# Patient Record
Sex: MALE
[De-identification: names, ages, dates, MRNs, and addresses within clinical notes are randomized per-mention and may not be internally consistent; named-entity substitution may affect disease eponyms.]

---

## 2023-08-15 SDOH — ECONOMIC STABILITY: HOUSING INSECURITY
IN THE LAST 12 MONTHS, WAS THERE A TIME WHEN YOU DID NOT HAVE A STEADY PLACE TO SLEEP OR SLEPT IN A SHELTER (INCLUDING NOW)?: NO

## 2023-08-15 SDOH — ECONOMIC STABILITY: FOOD INSECURITY: WITHIN THE PAST 12 MONTHS, YOU WORRIED THAT YOUR FOOD WOULD RUN OUT BEFORE YOU GOT MONEY TO BUY MORE.: NEVER TRUE

## 2023-08-15 SDOH — ECONOMIC STABILITY: HOUSING INSECURITY: IN THE LAST 12 MONTHS, HOW MANY PLACES HAVE YOU LIVED?: 2

## 2023-08-15 SDOH — ECONOMIC STABILITY: INCOME INSECURITY: IN THE LAST 12 MONTHS, WAS THERE A TIME WHEN YOU WERE NOT ABLE TO PAY THE MORTGAGE OR RENT ON TIME?: NO

## 2023-08-15 SDOH — HEALTH STABILITY: PHYSICAL HEALTH: ON AVERAGE, HOW MANY MINUTES DO YOU ENGAGE IN EXERCISE AT THIS LEVEL?: 40 MIN

## 2023-08-15 SDOH — HEALTH STABILITY: MENTAL HEALTH
STRESS IS WHEN SOMEONE FEELS TENSE, NERVOUS, ANXIOUS, OR CAN'T SLEEP AT NIGHT BECAUSE THEIR MIND IS TROUBLED. HOW STRESSED ARE YOU?: VERY MUCH

## 2023-08-15 SDOH — ECONOMIC STABILITY: TRANSPORTATION INSECURITY
IN THE PAST 12 MONTHS, HAS LACK OF RELIABLE TRANSPORTATION KEPT YOU FROM MEDICAL APPOINTMENTS, MEETINGS, WORK OR FROM GETTING THINGS NEEDED FOR DAILY LIVING?: NO

## 2023-08-15 SDOH — HEALTH STABILITY: PHYSICAL HEALTH: ON AVERAGE, HOW MANY DAYS PER WEEK DO YOU ENGAGE IN MODERATE TO STRENUOUS EXERCISE (LIKE A BRISK WALK)?: 1 DAY

## 2023-08-15 SDOH — ECONOMIC STABILITY: TRANSPORTATION INSECURITY
IN THE PAST 12 MONTHS, HAS LACK OF TRANSPORTATION KEPT YOU FROM MEETINGS, WORK, OR FROM GETTING THINGS NEEDED FOR DAILY LIVING?: NO

## 2023-08-15 SDOH — ECONOMIC STABILITY: TRANSPORTATION INSECURITY
IN THE PAST 12 MONTHS, HAS THE LACK OF TRANSPORTATION KEPT YOU FROM MEDICAL APPOINTMENTS OR FROM GETTING MEDICATIONS?: NO

## 2023-08-15 SDOH — ECONOMIC STABILITY: FOOD INSECURITY: WITHIN THE PAST 12 MONTHS, THE FOOD YOU BOUGHT JUST DIDN'T LAST AND YOU DIDN'T HAVE MONEY TO GET MORE.: NEVER TRUE

## 2023-08-15 SDOH — ECONOMIC STABILITY: INCOME INSECURITY: HOW HARD IS IT FOR YOU TO PAY FOR THE VERY BASICS LIKE FOOD, HOUSING, MEDICAL CARE, AND HEATING?: NOT HARD AT ALL

## 2023-08-15 ASSESSMENT — SOCIAL DETERMINANTS OF HEALTH (SDOH)
HOW OFTEN DO YOU GET TOGETHER WITH FRIENDS OR RELATIVES?: ONCE A WEEK
HOW OFTEN DO YOU ATTENT MEETINGS OF THE CLUB OR ORGANIZATION YOU BELONG TO?: NEVER
DO YOU BELONG TO ANY CLUBS OR ORGANIZATIONS SUCH AS CHURCH GROUPS UNIONS, FRATERNAL OR ATHLETIC GROUPS, OR SCHOOL GROUPS?: NO
HOW OFTEN DO YOU ATTEND CHURCH OR RELIGIOUS SERVICES?: NEVER
HOW OFTEN DO YOU HAVE A DRINK CONTAINING ALCOHOL: PATIENT DECLINED
HOW HARD IS IT FOR YOU TO PAY FOR THE VERY BASICS LIKE FOOD, HOUSING, MEDICAL CARE, AND HEATING?: NOT HARD AT ALL
HOW OFTEN DO YOU ATTEND CHURCH OR RELIGIOUS SERVICES?: NEVER
HOW OFTEN DO YOU HAVE SIX OR MORE DRINKS ON ONE OCCASION: MONTHLY
WITHIN THE PAST 12 MONTHS, YOU WORRIED THAT YOUR FOOD WOULD RUN OUT BEFORE YOU GOT THE MONEY TO BUY MORE: NEVER TRUE
DO YOU BELONG TO ANY CLUBS OR ORGANIZATIONS SUCH AS CHURCH GROUPS UNIONS, FRATERNAL OR ATHLETIC GROUPS, OR SCHOOL GROUPS?: NO
HOW OFTEN DO YOU GET TOGETHER WITH FRIENDS OR RELATIVES?: ONCE A WEEK
IN A TYPICAL WEEK, HOW MANY TIMES DO YOU TALK ON THE PHONE WITH FAMILY, FRIENDS, OR NEIGHBORS?: ONCE A WEEK
HOW OFTEN DO YOU ATTENT MEETINGS OF THE CLUB OR ORGANIZATION YOU BELONG TO?: NEVER
HOW MANY DRINKS CONTAINING ALCOHOL DO YOU HAVE ON A TYPICAL DAY WHEN YOU ARE DRINKING: PATIENT DECLINED
IN A TYPICAL WEEK, HOW MANY TIMES DO YOU TALK ON THE PHONE WITH FAMILY, FRIENDS, OR NEIGHBORS?: ONCE A WEEK

## 2023-08-15 ASSESSMENT — LIFESTYLE VARIABLES
AUDIT-C TOTAL SCORE: -1
HOW OFTEN DO YOU HAVE SIX OR MORE DRINKS ON ONE OCCASION: MONTHLY
HOW MANY STANDARD DRINKS CONTAINING ALCOHOL DO YOU HAVE ON A TYPICAL DAY: PATIENT DECLINED
HOW OFTEN DO YOU HAVE A DRINK CONTAINING ALCOHOL: PATIENT DECLINED
SKIP TO QUESTIONS 9-10: 0

## 2023-08-16 ENCOUNTER — OFFICE VISIT (OUTPATIENT)
Dept: MEDICAL GROUP | Facility: CLINIC | Age: 43
End: 2023-08-16
Payer: COMMERCIAL

## 2023-08-16 VITALS
HEART RATE: 80 BPM | SYSTOLIC BLOOD PRESSURE: 134 MMHG | DIASTOLIC BLOOD PRESSURE: 91 MMHG | TEMPERATURE: 97.9 F | BODY MASS INDEX: 23.85 KG/M2 | WEIGHT: 161 LBS | OXYGEN SATURATION: 95 % | HEIGHT: 69 IN | RESPIRATION RATE: 16 BRPM

## 2023-08-16 DIAGNOSIS — Z82.49 FAMILY HISTORY OF HYPERTENSION IN FATHER: ICD-10-CM

## 2023-08-16 DIAGNOSIS — G47.33 OSA (OBSTRUCTIVE SLEEP APNEA): ICD-10-CM

## 2023-08-16 DIAGNOSIS — F32.9 CURRENT EPISODE OF MAJOR DEPRESSIVE DISORDER WITHOUT PRIOR EPISODE, UNSPECIFIED DEPRESSION EPISODE SEVERITY: ICD-10-CM

## 2023-08-16 DIAGNOSIS — F17.200 CURRENT EVERY DAY SMOKER: ICD-10-CM

## 2023-08-16 PROCEDURE — 3075F SYST BP GE 130 - 139MM HG: CPT

## 2023-08-16 PROCEDURE — 99203 OFFICE O/P NEW LOW 30 MIN: CPT | Mod: GE

## 2023-08-16 PROCEDURE — 3080F DIAST BP >= 90 MM HG: CPT

## 2023-08-16 RX ORDER — BUPROPION HYDROCHLORIDE 150 MG/1
TABLET, EXTENDED RELEASE ORAL
Qty: 180 TABLET | Refills: 0 | Status: SHIPPED | OUTPATIENT
Start: 2023-08-16 | End: 2023-09-15

## 2023-08-16 RX ORDER — ESCITALOPRAM OXALATE 10 MG/1
10 TABLET ORAL DAILY
Qty: 30 TABLET | Refills: 2 | Status: CANCELLED | OUTPATIENT
Start: 2023-08-16 | End: 2023-11-14

## 2023-08-16 ASSESSMENT — ANXIETY QUESTIONNAIRES
4. TROUBLE RELAXING: MORE THAN HALF THE DAYS
2. NOT BEING ABLE TO STOP OR CONTROL WORRYING: NEARLY EVERY DAY
6. BECOMING EASILY ANNOYED OR IRRITABLE: SEVERAL DAYS
7. FEELING AFRAID AS IF SOMETHING AWFUL MIGHT HAPPEN: NEARLY EVERY DAY
5. BEING SO RESTLESS THAT IT IS HARD TO SIT STILL: MORE THAN HALF THE DAYS
GAD7 TOTAL SCORE: 17
1. FEELING NERVOUS, ANXIOUS, OR ON EDGE: NEARLY EVERY DAY
3. WORRYING TOO MUCH ABOUT DIFFERENT THINGS: NEARLY EVERY DAY

## 2023-08-16 ASSESSMENT — PATIENT HEALTH QUESTIONNAIRE - PHQ9
CLINICAL INTERPRETATION OF PHQ2 SCORE: 4
5. POOR APPETITE OR OVEREATING: 3 - NEARLY EVERY DAY
SUM OF ALL RESPONSES TO PHQ QUESTIONS 1-9: 20

## 2023-08-16 NOTE — PROGRESS NOTES
This note is formatted in an APSO format, for additional subjective and objective evaluation please scroll to the bottom of the note.    CC:  Chief Complaint   Patient presents with    Establish Care    Hypertension    Anxiety       Assessment/Plan:  Problem List Items Addressed This Visit       Family history of hypertension in father     - Recommend home BP log for 2 weeks, pt [facility] and we will prescribe an antihypertensive agent if indicated.  - Follow-up on sleep studies for known GIANFRANCO  - Counseled on smoking cessation         GIANFRANCO (obstructive sleep apnea)     - Requesting CPAP supplies, will refer to sleep medicine  - Referral to sleep medicine placed, printed, handed to patient         Relevant Orders    Referral to Pulmonary and Sleep Medicine    MDD (major depressive disorder)     - Start bupropion 150 mg SR daily for 3 days, then 150 mg SR twice daily thereafter  - Pt referred to Dr. Hernandez, she met with the patient today and agrees to schedule an appointment with him         Relevant Medications    buPROPion SR (WELLBUTRIN-SR) 150 MG TABLET SR 12 HR sustained-release tablet    Current every day smoker     - Not interested in cessation right now, but would be interested in a few months.  - Counseled on importance of smoking cessation for improvement of blood pressure.           No orders of the defined types were placed in this encounter.      Return in about 1 month (around 9/16/2023) for BP and depression follow-up.      HISTORY OF PRESENT ILLNESS: Patient is a 42 y.o. male established patient who presents today with:    Problem   Family History of Hypertension in Father    Father with hypertension, no history of CAD or CVA. Used BP cuff at pharmacy, noticed number was high around 150/95. Complaining of headaches, and extremity tingling when he feels like his BP was high. Never taken any BP meds in the past.     Gianfranco (Obstructive Sleep Apnea)    Diagnosed in [place].   Started using CPAP again for the past month because he was waking up with headaches. Morning headaches improved with CPAP overnight. Last sleep study +10 years ago.     Mdd (Major Depressive Disorder)    Patient feeling increased depression anxiety as he is in the process of divorce for the last 3 months.  No prior history of mental health symptoms, no family history, no suicidal ideation.  He is now waking up with headaches, tingling. PHQ-9 20.  SHANTA-7 17.  Patient requesting medication and mental health referral.     Current Every Day Smoker    10 pack-year history, 0.5 PPD for 20 yrs. Has tried nicotine patch, caused dizziness. Has tried nicotine gum, it was too spicy.         1. Family history of hypertension in father        2. JOE (obstructive sleep apnea)        3. Current episode of major depressive disorder without prior episode, unspecified depression episode severity        4. Current every day smoker            Patient Active Problem List    Diagnosis Date Noted    Family history of hypertension in father 08/16/2023    JOE (obstructive sleep apnea) 08/16/2023    MDD (major depressive disorder) 08/16/2023    Current every day smoker 08/16/2023      Allergies:Patient has no known allergies.    Current Outpatient Medications   Medication Sig Dispense Refill    hydrocodone-acetaminophen (NORCO) 5-325 MG TABS per tablet Take 1 Tab by mouth every 6 hours as needed. 15 Tab 0     No current facility-administered medications for this visit.       Social History     Tobacco Use    Smoking status: Every Day     Packs/day: .5     Types: Cigarettes     Passive exposure: Never    Smokeless tobacco: Never    Tobacco comments:     Has tried nicotine patches and gum in the past.  Interested in trying again, but would like to wait until he is finished with his divorce.   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  He moved to [place] from [facility] 11 years ago.  He is in the process of  his wife of 10 years.       Family History   Problem Relation Age of Onset    Hypertension Father        Exam:    BP (!) 134/91 (BP Location: Left arm, Patient Position: Sitting, BP Cuff Size: Adult)   Pulse 80   Temp 36.6 °C (97.9 °F) (Temporal)   Resp 16   Ht 1.753 m (5' 9\")   Wt 73 kg (161 lb)   SpO2 95%   BMI 23.78 kg/m²  Body mass index is 23.78 kg/m².    Gen: Alert and oriented, No apparent distress. Well developed, polite, friendly  HEENT: NCAT, MMM  Neck: Supple, FROM  Chest: No deformities, Equal chest expansion  Lungs: Normal effort, CTA bilaterally, no wheezes, rhonchi, or rales  CV: Regular rate and rhythm. No murmurs, rubs, or gallops. Pulse palpable  Abd: Non-distended  Ext: No clubbing, cyanosis, edema.  Skin: Warm/dry, without rashes  Neuro: A/O x 4, CN 2-12 Grossly intact, Motor/sensory grossly intact  Psych: Flat affect, reserved.  No SI/HI.      Mehul Johansen M.D.   PGY-1  [facility]     "

## 2023-08-16 NOTE — ASSESSMENT & PLAN NOTE
- Recommend home BP log for 2 weeks, pt will send message with BP to [facility] and we will prescribe an antihypertensive agent if indicated.  - Follow-up on sleep studies for known JOE  - Counseled on smoking cessation

## 2023-08-16 NOTE — ASSESSMENT & PLAN NOTE
- Start bupropion 150 mg SR daily for 3 days, then 150 mg SR twice daily thereafter  - Pt referred to Dr. Hernandez, she met with the patient today and agrees to schedule an appointment with him

## 2023-08-16 NOTE — ASSESSMENT & PLAN NOTE
- Requesting CPAP supplies, will refer to sleep medicine  - Referral to sleep medicine placed, printed, handed to patient

## 2023-08-16 NOTE — ASSESSMENT & PLAN NOTE
- Not interested in cessation right now, but would be interested in a few months.  - Counseled on importance of smoking cessation for improvement of blood pressure.

## 2023-08-29 ENCOUNTER — OFFICE VISIT (OUTPATIENT)
Dept: MEDICAL GROUP | Facility: CLINIC | Age: 43
End: 2023-08-29
Payer: COMMERCIAL

## 2023-08-29 DIAGNOSIS — F32.0 CURRENT MILD EPISODE OF MAJOR DEPRESSIVE DISORDER, UNSPECIFIED WHETHER RECURRENT (HCC): ICD-10-CM

## 2023-08-29 PROCEDURE — 90834 PSYTX W PT 45 MINUTES: CPT | Performed by: PSYCHOLOGIST
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
## 2023-08-31 ENCOUNTER — TELEPHONE (OUTPATIENT)
Dept: HEALTH INFORMATION MANAGEMENT | Facility: OTHER | Age: 43
End: 2023-08-31
Payer: COMMERCIAL

## 2023-09-15 ENCOUNTER — OFFICE VISIT (OUTPATIENT)
Dept: MEDICAL GROUP | Facility: CLINIC | Age: 43
End: 2023-09-15
Payer: COMMERCIAL

## 2023-09-15 VITALS
WEIGHT: 160 LBS | HEART RATE: 98 BPM | TEMPERATURE: 98.7 F | SYSTOLIC BLOOD PRESSURE: 123 MMHG | BODY MASS INDEX: 23.7 KG/M2 | HEIGHT: 69 IN | DIASTOLIC BLOOD PRESSURE: 89 MMHG | OXYGEN SATURATION: 95 %

## 2023-09-15 DIAGNOSIS — F17.200 CURRENT EVERY DAY SMOKER: ICD-10-CM

## 2023-09-15 DIAGNOSIS — F32.9 CURRENT EPISODE OF MAJOR DEPRESSIVE DISORDER WITHOUT PRIOR EPISODE, UNSPECIFIED DEPRESSION EPISODE SEVERITY: ICD-10-CM

## 2023-09-15 DIAGNOSIS — G47.33 OSA (OBSTRUCTIVE SLEEP APNEA): ICD-10-CM

## 2023-09-15 DIAGNOSIS — Z82.49 FAMILY HISTORY OF HYPERTENSION IN FATHER: ICD-10-CM

## 2023-09-15 DIAGNOSIS — F19.982 DRUG-INDUCED INSOMNIA (HCC): ICD-10-CM

## 2023-09-15 PROBLEM — G47.00 INSOMNIA: Status: ACTIVE | Noted: 2023-09-15

## 2023-09-15 PROCEDURE — 99213 OFFICE O/P EST LOW 20 MIN: CPT | Mod: GC

## 2023-09-15 PROCEDURE — 3074F SYST BP LT 130 MM HG: CPT

## 2023-09-15 PROCEDURE — 3079F DIAST BP 80-89 MM HG: CPT

## 2023-09-15 RX ORDER — BUPROPION HYDROCHLORIDE 100 MG/1
100 TABLET, EXTENDED RELEASE ORAL 2 TIMES DAILY
Qty: 180 TABLET | Refills: 0 | Status: SHIPPED | OUTPATIENT
Start: 2023-09-15 | End: 2023-12-26

## 2023-09-15 ASSESSMENT — PATIENT HEALTH QUESTIONNAIRE - PHQ9
SUM OF ALL RESPONSES TO PHQ QUESTIONS 1-9: 16
CLINICAL INTERPRETATION OF PHQ2 SCORE: 2
5. POOR APPETITE OR OVEREATING: 3 - NEARLY EVERY DAY

## 2023-09-15 NOTE — PROGRESS NOTES
This note is formatted in an APSO format, for additional subjective and objective evaluation please scroll to the bottom of the note.    CC:  Chief Complaint   Patient presents with    Follow-Up     Bupropion wants to discuss sleep side effects       Assessment/Plan:  Problem List Items Addressed This Visit       Family history of hypertension in father     Blood pressure is at goal for age and risk factors.  - Discussed lifestyle interventions for blood pressure control, including aerobic exercise, 30 minutes 3 times/week for cardiovascular fitness         JOE (obstructive sleep apnea)     -Scheduled for April 2024 with sleep medicine         MDD (major depressive disorder)     Since starting bupropion, states symptoms are improving.  PHQ-9 decreased to 16 from 20 on initial presentation.  Reporting symptoms of insomnia, decreased morning erections.  - Will decrease dose of bupropion to 100 mg twice daily  - If continuing to experience side effects, will consider switching to SSRI  - Encouraged to follow-up with mental health specialists  - Follow-up in 3 months         Relevant Medications    buPROPion SR (WELLBUTRIN-SR) 100 MG TABLET SR 12 HR    Current every day smoker     Decreased desire for smoking since starting bupropion.  - Continue bupropion  - Additional counseling for smoking cessation given         Insomnia     Associated with starting bupropion, no prior history of insomnia prior to starting this medication.  Depression scores improved, smoking desire decreased with this medication.  - We will start with decreasing dose from 150 to 100 mg twice daily  - If continuing to struggle with insomnia, will switch to SSRIs once daily           Orders Placed This Encounter    buPROPion SR (WELLBUTRIN-SR) 100 MG TABLET SR 12 HR       Return in about 3 months (around 12/15/2023) for medication follow up.      HISTORY OF PRESENT ILLNESS: Patient is a 43 y.o. male established patient who presents today  with:    Problem   Insomnia    Starting 3 weeks ago, one week after starting bupropion. Taking medication at 7:30 AM/PM. Able to fall asleep, but consistently waking up between 3 and 4 AM every day. Unable to go back to sleep. Feeling tired throughout the day as a result. Has not tried any other medications to help with sleep.     Family History of Hypertension in Father    Father with hypertension, no history of CAD or CVA. Used BP cuff at pharmacy, noticed number was high around 150/95. Complaining of headaches, and extremity tingling when he feels like his BP was high. Never taken any BP meds in the past.    On follow-up, blood pressures at home are in the 120s to 130s systolic     Gianfranco (Obstructive Sleep Apnea)    Diagnosed in [place].  Started using CPAP again for the past month because he was waking up with headaches. Morning headaches improved with CPAP overnight. Last sleep study +10 years ago.     Mdd (Major Depressive Disorder)    Patient feeling increased depression anxiety as he is in the process of divorce for the last 3 months.  No prior history of mental health symptoms, no family history, no suicidal ideation.  He is now waking up with headaches, tingling. PHQ-9 20.  SHANTA-7 17.  Patient requesting medication and mental health referral.    On follow-up, symptoms improved with bupropion.  PHQ-9 16.  Did meet with psychology, but has not followed up since intake visit as he went to [place] to visit family and then has been busy with the new school year.  Is reporting some side effects, including insomnia (see #insomnia), decreased morning erections, decreased desire for tobacco smoking.     Current Every Day Smoker    10 pack-year history, 0.5 PPD for 20 yrs. Has tried nicotine patch, caused dizziness. Has tried nicotine gum, it was too spicy.    Decreased desire for smoking since starting bupropion.         1. Drug-induced insomnia (HCC)        2. Current episode of major depressive disorder without prior  "episode, unspecified depression episode severity        3. JOE (obstructive sleep apnea)        4. Current every day smoker        5. Family history of hypertension in father            Patient Active Problem List    Diagnosis Date Noted    Insomnia 09/15/2023    Family history of hypertension in father 08/16/2023    JOE (obstructive sleep apnea) 08/16/2023    MDD (major depressive disorder) 08/16/2023    Current every day smoker 08/16/2023      Allergies:Patient has no known allergies.    Current Outpatient Medications   Medication Sig Dispense Refill    buPROPion SR (WELLBUTRIN-SR) 100 MG TABLET SR 12 HR Take 1 Tablet by mouth 2 times a day for 90 days. 180 Tablet 0     No current facility-administered medications for this visit.       Social History     Tobacco Use    Smoking status: Every Day     Current packs/day: 0.50     Types: Cigarettes     Passive exposure: Never    Smokeless tobacco: Never    Tobacco comments:     Has tried nicotine patches and gum in the past.  Interested in trying again, but would like to wait until he is finished with his divorce.   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 11 years ago.  He is in the process of  his wife of 10 years.       Family History   Problem Relation Age of Onset    Hypertension Father        Exam:    /89 (BP Location: Left arm, Patient Position: Sitting, BP Cuff Size: Adult)   Pulse 98   Temp 37.1 °C (98.7 °F) (Temporal)   Ht 1.753 m (5' 9\")   Wt 72.6 kg (160 lb)   SpO2 95%   BMI 23.63 kg/m²  Body mass index is 23.63 kg/m².    Gen: Alert and oriented, No apparent distress. Well developed  HEENT: NCAT, MMM  Neck: Supple, FROM  Chest: No deformities, Equal chest expansion  Lungs: Normal effort, CTA bilaterally, no wheezes, rhonchi, or " rales  CV: Regular rate and rhythm. No murmurs, rubs, or gallops. Pulse palpable  Abd: Non-distended  Ext: No clubbing, cyanosis, edema.  Skin: Warm/dry, without rashes  Neuro: A/O x 4, CN 2-12 Grossly intact, Motor/sensory grossly intact  Psych: Depressed affect. PHQ-9 score 16. Denies SI/HI      Mehul Johansen M.D.   PGY-1  [facility]

## 2023-09-15 NOTE — ASSESSMENT & PLAN NOTE
Decreased desire for smoking since starting bupropion.  - Continue bupropion  - Additional counseling for smoking cessation given

## 2023-09-15 NOTE — ASSESSMENT & PLAN NOTE
Since starting bupropion, states symptoms are improving.  PHQ-9 decreased to 16 from 20 on initial presentation.  Reporting symptoms of insomnia, decreased morning erections.  - Will decrease dose of bupropion to 100 mg twice daily  - If continuing to experience side effects, will consider switching to SSRI  - Encouraged to follow-up with mental health specialists  - Follow-up in 3 months   n/a n/a done n/a

## 2023-09-15 NOTE — ASSESSMENT & PLAN NOTE
Blood pressure is at goal for age and risk factors.  - Discussed lifestyle interventions for blood pressure control, including aerobic exercise, 30 minutes 3 times/week for cardiovascular fitness

## 2023-09-15 NOTE — ASSESSMENT & PLAN NOTE
Associated with starting bupropion, no prior history of insomnia prior to starting this medication.  Depression scores improved, smoking desire decreased with this medication.  - We will start with decreasing dose from 150 to 100 mg twice daily  - If continuing to struggle with insomnia, will switch to SSRIs once daily

## 2024-04-08 ENCOUNTER — OFFICE VISIT (OUTPATIENT)
Dept: SLEEP MEDICINE | Facility: MEDICAL CENTER | Age: 44
End: 2024-04-08
Payer: COMMERCIAL

## 2024-04-08 VITALS
WEIGHT: 170.4 LBS | HEART RATE: 84 BPM | DIASTOLIC BLOOD PRESSURE: 82 MMHG | SYSTOLIC BLOOD PRESSURE: 118 MMHG | RESPIRATION RATE: 16 BRPM | HEIGHT: 68 IN | BODY MASS INDEX: 25.82 KG/M2 | OXYGEN SATURATION: 97 %

## 2024-04-08 DIAGNOSIS — R06.83 SNORING: ICD-10-CM

## 2024-04-08 DIAGNOSIS — G47.19 EXCESSIVE DAYTIME SLEEPINESS: ICD-10-CM

## 2024-04-08 DIAGNOSIS — G47.33 OSA (OBSTRUCTIVE SLEEP APNEA): Primary | ICD-10-CM

## 2024-04-08 PROCEDURE — 99211 OFF/OP EST MAY X REQ PHY/QHP: CPT | Performed by: STUDENT IN AN ORGANIZED HEALTH CARE EDUCATION/TRAINING PROGRAM

## 2024-04-08 PROCEDURE — 3074F SYST BP LT 130 MM HG: CPT | Performed by: STUDENT IN AN ORGANIZED HEALTH CARE EDUCATION/TRAINING PROGRAM

## 2024-04-08 PROCEDURE — 3079F DIAST BP 80-89 MM HG: CPT | Performed by: STUDENT IN AN ORGANIZED HEALTH CARE EDUCATION/TRAINING PROGRAM

## 2024-04-08 PROCEDURE — 99203 OFFICE O/P NEW LOW 30 MIN: CPT | Performed by: STUDENT IN AN ORGANIZED HEALTH CARE EDUCATION/TRAINING PROGRAM

## 2024-04-08 ASSESSMENT — PATIENT HEALTH QUESTIONNAIRE - PHQ9: CLINICAL INTERPRETATION OF PHQ2 SCORE: 0

## 2024-04-08 NOTE — PROGRESS NOTES
[facility] Sleep Center Consult Note     Date: 4/8/2024 / Time: 2:02 PM      Thank you for requesting a sleep medicine consultation on Ashley Corrales at the sleep center. Presents today with the chief complaints of snoring, unrefreshing sleep, history of obstructive sleep apnea. He is referred by Mehul Johansen M.D.  [address] [place] [id number] for evaluation and treatment of sleep disorder breathing .     HISTORY OF PRESENT ILLNESS:     Ashley Corrales is a 43 y.o. male with history of obstructive sleep apnea, daytime fatigue, unrefreshing sleep and snoring.  Presents to Sleep Clinic for evaluation of sleep.     He reports being diagnosed with obstructive sleep apnea over 13 years ago while in [place].  He did purchase a Derik PAP machine and has tried to use it for years but has had difficulty using it.  He gets significantly bloated with use.  He has tried multiple different masks and lip tape.  He states his machine can also become a auto BiPAP and even tried BiPAP without benefit. Even with using the CPAP for several months he never really felt it was helping his sleep.    He continues to find his sleep nonrestorative.  He can have fatigue during the day.  He can be irritable at times during the day.  He states in the past he has been told that he stops breathing.  He does wake up with a dry mouth and occasionally has morning headaches.    As per supplemental questionnaire to be scanned or imported into chart:    [place] Sleepiness Score: 10    Sleep Schedule  Bedtime: Weekday 9-10pm  Weekend  10-11pm  Wake time: Weekday 6-7am Weekend 9-10am  Sleep-onset latency: mins  Awakenings from sleep: 1-2   Difficulty falling back asleep: No  Bedroom partner: No  Naps: Yes sometimes    DAYTIME SYMPTOMS:   Excessive daytime sleepiness: Yes  Daytime fatigue: Yes  Difficulty concentrating: at times   Memory problems: No   Irritability:Yes sometimes   Work/school performance issues: No   Sleepiness with  "driving: No , long drives   Caffeine/stimulant use: Yes  Alcohol use:Yes, How Many? Once a week      SLEEP RELATED SYMPTOMS  Snoring: Yes  Witnessed apnea or gasping/choking: Yes, witnssed apneas   Dry mouth or mouth breathing: Yes  Sweating: No   Teeth grinding/biting: No   Morning headaches: Yes  Refreshed Upon Awakening: No      SLEEP RELATED BEHAVIORS:  Parasomnias (walking, talking, eating, violence): No   Leg kicking: No   Restless legs - \"urge to move\": No   Nightmares: No  Recurrent: No   Dream enactment: No      NARCOLEPSY:  Cataplexy: No   Sleep paralysis: No   Sleep attacks: No   Hypnagogic/hypnopompic hallucinations: No     MEDICAL HISTORY  Past Medical History:   Diagnosis Date    Apnea, sleep     Daytime sleepiness     Gasping for breath     Morning headache     JOE (obstructive sleep apnea) 2013    Snoring         SURGICAL HISTORY  No past surgical history on file.     FAMILY HISTORY  Family History   Problem Relation Age of Onset    Hypertension Father        SOCIAL HISTORY  Social History     Socioeconomic History    Marital status: Unknown    Highest education level: Doctorate   Tobacco Use    Smoking status: Every Day     Current packs/day: 0.50     Types: Cigarettes     Passive exposure: Never    Smokeless tobacco: Never    Tobacco comments:     Has tried nicotine patches and gum in the past.  Interested in trying again, but would like to wait until he is finished with his divorce.   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  He is in the process of  his wife of 10 years.     Social Determinants of Health     Financial Resource Strain: Low Risk  (8/15/2023)    Overall Financial Resource Strain (CARDIA)     Difficulty of Paying " Living Expenses: Not hard at all   Food Insecurity: No Food Insecurity (8/15/2023)    Hunger Vital Sign     Worried About Running Out of Food in the Last Year: Never true     Ran Out of Food in the Last Year: Never true   Transportation Needs: No Transportation Needs (8/15/2023)    PRAPARE - Transportation     Lack of Transportation (Medical): No     Lack of Transportation (Non-Medical): No   Physical Activity: Insufficiently Active (8/15/2023)    Exercise Vital Sign     Days of Exercise per Week: 1 day     Minutes of Exercise per Session: 40 min   Stress: Stress Concern Present (8/15/2023)    [place] [employer] - Occupational Stress Questionnaire     Feeling of Stress : Very much   Social Connections: Socially Isolated (8/15/2023)    Social Connection and Isolation Panel [NHANES]     Frequency of Communication with Friends and Family: Once a week     Frequency of Social Gatherings with Friends and Family: Once a week     Attends Spiritism Services: Never     Active Member of Clubs or Organizations: No     Attends Club or Organization Meetings: Never     Marital Status:    Housing Stability: Low Risk  (8/15/2023)    Housing Stability Vital Sign     Unable to Pay for Housing in the Last Year: No     Number of Places Lived in the Last Year: 2     Unstable Housing in the Last Year: No        Occupation: Professor [employer]     CURRENT MEDICATIONS  Current Outpatient Medications   Medication Sig Dispense Refill    buPROPion SR (WELLBUTRIN-SR) 100 MG TABLET SR 12 HR Take 1 Tablet by mouth 2 times a day for 90 days. 180 Tablet 3     No current facility-administered medications for this visit.       REVIEW OF SYSTEMS  Constitutional: Denies fevers, Denies weight changes  Ears/Nose/Throat/Mouth: Denies nasal congestion or sore throat   Cardiovascular: Denies chest pain  Respiratory: Denies shortness of breath, Denies cough  Gastrointestinal/Hepatic: Denies nausea, vomiting  Sleep:  "see HPI    Physical Examination:  Vitals/ General Appearance:   Weight/BMI: Body mass index is 25.91 kg/m².  /82 (BP Location: Left arm, Patient Position: Sitting, BP Cuff Size: Adult)   Pulse 84   Resp 16   Ht 1.727 m (5' 8\")   Wt 77.3 kg (170 lb 6.4 oz)   SpO2 97%   Vitals:    04/08/24 1405   BP: 118/82   BP Location: Left arm   Patient Position: Sitting   BP Cuff Size: Adult   Pulse: 84   Resp: 16   SpO2: 97%   Weight: 77.3 kg (170 lb 6.4 oz)   Height: 1.727 m (5' 8\")       Pt. is alert and oriented to time, place and person. Cooperative and in no apparent distress.     Constitutional: Alert, no distress, well-groomed.  Skin: No rashes in visible areas.  Eye: Round. Conjunctiva clear, lids normal. No icterus.   ENT EXAM  Nasal alae/valves collapsible: No   Nasal septum deviation: No   Nasal turbinate hypertrophy: Left: Grade 1   Right: Grade 1  Hard palate narrow: No   Hard palate high: No   Soft palate/uvula (Mallampati score): 4  Tongue Scalloping: No   Retrognathia: No   Micrognathia: No   Cardiovascular:no murmus/gallops/rubs, normal S1 and S2 heart sounds, regular rate and rhythm  Pulmonary:Clear to auscultation, No wheezes, No crackles.  Neurologic:Awake, alert and oriented x 3, Normal age appropriate gait, No involuntary motions.  Extremities: No clubbing, cyanosis, or edema       ASSESSMENT AND PLAN   Ashley oCrrales is a 43 y.o. male with history of obstructive sleep apnea, daytime fatigue, unrefreshing sleep and snoring.  Presents to Sleep Clinic for evaluation of sleep.     1. Ashley Corrales  has hx of Obstructive Sleep Apnea (JOE). Ashley Corrales has symptoms of snoring,  witnessed apnea, dry mouth, morning headaches, unrefreshed upon awakening. These can interfere with activities of daily living.   ESS 10  Pt has risk factors for JOE include crowded oropharynx.     The pathophysiology of JOE and the increased risk of cardiovascular morbidity from untreated JOE is discussed in detail with the " patient.       We have discussed diagnostic options including in-laboratory, attended polysomnography and home sleep testing. We have also discussed treatment options including airway pressurization, reconstructive otolaryngologic surgery, dental appliances and weight management.     Subsequently,treatment options will be discussed based on the diagnostic study. Meanwhile, He is urged to avoid supine sleep, weight gain and alcoholic beverages since all of these can worsen JOE. He is cautioned against drowsy driving. If He feels sleepy while driving, advised must pull over for a break/nap, rather than persist on the road, in the interest of Pt's own safety and that of others on the road.    Plan  -  He  will be scheduled for an overnight HST to assess sleep related breathing disorder.  - Follow up 1-2 weeks after sleep study to discuss results and treatment options moving forward   -Advised to reach out via MyChart or by phone with any questions or concerns.     2.  Regarding treatment of other past medical problems and general health maintenance,  Pt is urged to follow up with PCP.      Please note portions of this record was created using voice recognition software. I have made every reasonable attempt to correct obvious errors, but I expect that there are errors of grammar and possibly content I did not discover before finalizing the note.

## 2024-04-24 ENCOUNTER — OFFICE VISIT (OUTPATIENT)
Dept: MEDICAL GROUP | Facility: CLINIC | Age: 44
End: 2024-04-24
Payer: COMMERCIAL

## 2024-04-24 VITALS
HEART RATE: 66 BPM | OXYGEN SATURATION: 97 % | DIASTOLIC BLOOD PRESSURE: 102 MMHG | WEIGHT: 169 LBS | TEMPERATURE: 97 F | SYSTOLIC BLOOD PRESSURE: 149 MMHG | HEIGHT: 69 IN | BODY MASS INDEX: 25.03 KG/M2 | RESPIRATION RATE: 12 BRPM

## 2024-04-24 DIAGNOSIS — F32.9 CURRENT EPISODE OF MAJOR DEPRESSIVE DISORDER WITHOUT PRIOR EPISODE, UNSPECIFIED DEPRESSION EPISODE SEVERITY: ICD-10-CM

## 2024-04-24 DIAGNOSIS — R40.0 DAYTIME SLEEPINESS: ICD-10-CM

## 2024-04-24 DIAGNOSIS — F17.200 CURRENT EVERY DAY SMOKER: ICD-10-CM

## 2024-04-24 DIAGNOSIS — Z82.49 FAMILY HISTORY OF HYPERTENSION IN FATHER: ICD-10-CM

## 2024-04-24 DIAGNOSIS — Z00.00 HEALTH CARE MAINTENANCE: ICD-10-CM

## 2024-04-24 DIAGNOSIS — Z83.3 FAMILY HISTORY OF DIABETES MELLITUS: ICD-10-CM

## 2024-04-24 LAB
HBA1C MFR BLD: 4.9 % (ref ?–5.8)
POCT INT CON NEG: NEGATIVE
POCT INT CON POS: POSITIVE

## 2024-04-24 PROCEDURE — 83036 HEMOGLOBIN GLYCOSYLATED A1C: CPT | Mod: QW,GC

## 2024-04-24 PROCEDURE — 99214 OFFICE O/P EST MOD 30 MIN: CPT | Mod: GC

## 2024-04-24 RX ORDER — VARENICLINE TARTRATE 0.5 (11)-1
KIT ORAL
Qty: 1 EACH | Refills: 0 | Status: SHIPPED | OUTPATIENT
Start: 2024-04-24 | End: 2024-05-22

## 2024-04-24 ASSESSMENT — PATIENT HEALTH QUESTIONNAIRE - PHQ9: CLINICAL INTERPRETATION OF PHQ2 SCORE: 0

## 2024-04-25 NOTE — ASSESSMENT & PLAN NOTE
Family history of hypertension with elevated BP reading in office.  However, patient has been checking blood pressure at home a few times per week with pressures in the 110s over 90s typically.  - Patient instructed to continue monitor blood pressure at home  - No medication intervention at this time

## 2024-04-25 NOTE — ASSESSMENT & PLAN NOTE
Excess daytime sleepiness following meals.  Patient has a history of JOE since age 30, with patient reporting symptoms much earlier, as early as middle school.  Has been in to see sleep specialists and has a sleep study ordered but not yet scheduled.  No signs or symptoms of narcolepsy, no depressive symptoms at this time to suggest psychiatric cause.  I suspect his symptoms are most likely related to untreated sleep apnea.  - Recommend patient follow-up closely with sleep medicine to obtain sleep study  - Ordered CBC, CMP, lipid panel, TSH, A1c to rule out metabolic causes  - Follow-up in 2 months with results of sleep study and lab results

## 2024-04-25 NOTE — ASSESSMENT & PLAN NOTE
All depressive symptoms seem to have resolved since his divorce was finalized earlier this year.  PHQ-9 score is 0 today.  Patient is complaining of excessive sleepiness after meals; however, in the absence of any other depressive symptoms, depression is not likely the underlying etiology.  - Monitor for recurrence of depressive symptoms

## 2024-04-25 NOTE — ASSESSMENT & PLAN NOTE
Point care A1c in office today is 4.9.  Informed patient of results.  - Consider yearly A1c screening or sooner if patient is symptomatic

## 2024-04-25 NOTE — ASSESSMENT & PLAN NOTE
- Ordered starter pack of Chantix  - Patient counseled on behavioral modifications, recommended setting a quit date  - Follow-up in 30 days via MyChart on current status of medication, will refill for 2 additional months if needed   hair removal not indicated

## 2024-04-25 NOTE — PROGRESS NOTES
This note is formatted in an APSO format, for additional subjective and objective evaluation please scroll to the bottom of the note.    CC:  Chief Complaint   Patient presents with    Annual Exam    Fatigue     After any meal- 2 months        Assessment/Plan:  Problem List Items Addressed This Visit          Family Medicine Problems    Health care maintenance     - Referral to [facility] LOTT study  - Ordered once in lifetime HCV/HIV screening         Relevant Orders    Referral to Genetic Research Studies    HEP C VIRUS ANTIBODY    HIV AG/AB COMBO ASSAY SCREENING       Other    Family history of hypertension in father     Family history of hypertension with elevated BP reading in office.  However, patient has been checking blood pressure at home a few times per week with pressures in the 110s over 90s typically.  - Patient instructed to continue monitor blood pressure at home  - No medication intervention at this time         MDD (major depressive disorder)     All depressive symptoms seem to have resolved since his divorce was finalized earlier this year.  PHQ-9 score is 0 today.  Patient is complaining of excessive sleepiness after meals; however, in the absence of any other depressive symptoms, depression is not likely the underlying etiology.  - Monitor for recurrence of depressive symptoms         Current every day smoker     - Ordered starter pack of Chantix  - Patient counseled on behavioral modifications, recommended setting a quit date  - [facility], will refill for 2 additional months if needed         Relevant Medications    Varenicline Tartrate, Starter, 0.5 MG X 11 & 1 MG X 42 Tablet Therapy Pack    Daytime sleepiness     Excess daytime sleepiness following meals.  Patient has a history of JOE since age 30, with patient reporting symptoms much earlier, as early as middle school.  Has been in to see sleep specialists and has a sleep  "study ordered but not yet scheduled.  No signs or symptoms of narcolepsy, no depressive symptoms at this time to suggest psychiatric cause.  I suspect his symptoms are most likely related to untreated sleep apnea.  - Recommend patient follow-up closely with sleep medicine to obtain sleep study  - Ordered CBC, CMP, lipid panel, TSH, A1c to rule out metabolic causes  - Follow-up in 2 months with results of sleep study and lab results         Relevant Orders    CBC WITH DIFFERENTIAL    Comp Metabolic Panel    TSH WITH REFLEX TO FT4    Lipid Profile    Family history of diabetes mellitus     Point care A1c in office today is 4.9.  Informed patient of results.  - Consider yearly A1c screening or sooner if patient is symptomatic         Relevant Orders    POCT  A1C (Completed)    CBC WITH DIFFERENTIAL    Comp Metabolic Panel    TSH WITH REFLEX TO FT4    Lipid Profile      Orders Placed This Encounter    CBC WITH DIFFERENTIAL    Comp Metabolic Panel    TSH WITH REFLEX TO FT4    Lipid Profile    HEP C VIRUS ANTIBODY    HIV AG/AB COMBO ASSAY SCREENING    Referral to Genetic Research Studies    POCT  A1C    Varenicline Tartrate, Starter, 0.5 MG X 11 & 1 MG X 42 Tablet Therapy Pack       Return in about 2 months (around 6/24/2024) for JOE, labs f/u.    Future Appointments   Date Time Provider Department Center   5/24/2024  1:20 PM SLEEP CLINIC [facility] None   5/29/2024  2:00 PM Mehul Johansen M.D. [facility]   6/17/2024 12:50 PM BRYANT Weeks. [facility] None        LABS: Results reviewed and discussed with the patient, questions answered.    HISTORY OF PRESENT ILLNESS: Patient is a 43 y.o. male established patient who presents today with:    Problem   Daytime Sleepiness    Pt reports experiencing increased sleepiness after eating. He had these symptoms intermittently 5-7 years ago, but \"nothing serious\" at that time. Was not affecting his day to day life at that time.     Now feels that these symptoms are affecting " his life. He goes to a restaurant, and when he comes back, he feels so sleepy he feels he cannot do any work. This occurs about 25-35 minutes after eating, then improves after napping for about 20 minutes. He has had to pull over while driving due to these symptoms. This has been going on intermittently, worse with meat-containing meals. This has been the case since around January, gradually increasing to happening every day since the end of February.     Most days, he eats dinner around 6:30 and then falls asleep around 7 PM. Most days he eats lunch around noon and then has to nap around 1 PM. If he eats breakfast in the morning, he becomes more sleepy as well.     Has tried playing video games or other activities to keep himself awake without success. Does not happen other times when he has not had anything to eat. He always feels drowsy prior to his napping episodes, never suddenly falling asleep without warning. Sometimes takes a multivitamin, but no other supplements. Does drink about 3 cups of coffee per week, but this has not changed in over 5 years. No diagnoses of any neurologic disorders. At the time these symptoms began again, he was still smoking every day. Has quit for the past 5 days and has seen no improvement.     Has diagnosis of JOE from 13 years ago in [place], has an old CPAP machine at home which he bought himself here, similar to the one he had in [place]. He has not used it in about a year as he states this is not helping him and he feels more tired during the day when he uses it. He saw sleep medicine on 4/8/2024 and they ordered a home sleep study, but he has not been sent the supplies for it yet. His father and grandfather also have JOE, and he thinks he has had symptoms of JOE since middle school. However, he was not diagnosed until age 30 while serving in the [employer] .     Pt has a father with T2DM, and he is concerned his symptoms might be related to hyperglycemia.      Family History  "of Diabetes Mellitus    Patient reports his father has type 2 diabetes.  He is concerned that his recent symptoms of excess daytime sleepiness may be related to diabetes.  Is desiring screening.     Health Care Maintenance   Family History of Hypertension in Father    Father with hypertension, no history of CAD or CVA. Used BP cuff at pharmacy, noticed number was high around 150/95. Complaining of headaches, and extremity tingling when he feels like his BP was high. Never taken any BP meds in the past.    On follow-up, blood pressures at home are in the 120s to 130s systolic.    On follow up 04/24/2024, BP is 149/102 in office. However, pt has been checking a few times per week and they are usually 110s over 90s.      Mdd (Major Depressive Disorder)    Patient feeling increased depression anxiety as he is in the process of divorce for the last 3 months.  No prior history of mental health symptoms, no family history, no suicidal ideation.  He is now waking up with headaches, tingling. PHQ-9 20.  SHANTA-7 17.  Patient requesting medication and mental health referral.    On follow-up, symptoms improved with bupropion.  PHQ-9 16.  Did meet with psychology, but has not followed up since intake visit as he went to [place] to visit family and then has been busy with the new school year.  Is reporting some side effects, including insomnia (see #insomnia), decreased morning erections, decreased desire for tobacco smoking.    On follow up 04/24/2024, he feels his depression symptoms have significantly improved. Says \"I can do everything again,\" getting all of his work done without limitations, saying \"I feel normal again\" besides the excessive daytime sleepiness. PHQ-9 score is zero.      Current Every Day Smoker    10 pack-year history, 0.5 PPD for 20 yrs. Has tried nicotine patch, caused dizziness. Has tried nicotine gum, it was too spicy.    Decreased desire for smoking since starting bupropion.    On follow up 04/24/2024, has " a desire to stop smoking now that he is feeling better. Clicked on an online ad for non-nicotine vape pens. He is interested in medication assistance and has not smoked in 5 days. Would like to try Chantix.          1. Daytime sleepiness  CBC WITH DIFFERENTIAL    Comp Metabolic Panel    TSH WITH REFLEX TO FT4    Lipid Profile      2. Current episode of major depressive disorder without prior episode, unspecified depression episode severity        3. Current every day smoker  Varenicline Tartrate, Starter, 0.5 MG X 11 & 1 MG X 42 Tablet Therapy Pack      4. Family history of hypertension in father        5. Family history of diabetes mellitus  POCT  A1C    CBC WITH DIFFERENTIAL    Comp Metabolic Panel    TSH WITH REFLEX TO FT4    Lipid Profile      6. Health care maintenance  Referral to Genetic Research Studies    HEP C VIRUS ANTIBODY    HIV AG/AB COMBO ASSAY SCREENING          Patient Active Problem List    Diagnosis Date Noted    Daytime sleepiness 04/24/2024    Family history of diabetes mellitus 04/24/2024    Health care maintenance 04/24/2024    Insomnia 09/15/2023    Family history of hypertension in father 08/16/2023    JOE (obstructive sleep apnea) 08/16/2023    MDD (major depressive disorder) 08/16/2023    Current every day smoker 08/16/2023      Allergies:Patient has no known allergies.    Current Outpatient Medications   Medication Sig Dispense Refill    Varenicline Tartrate, Starter, 0.5 MG X 11 & 1 MG X 42 Tablet Therapy Pack Take 0.5 mg by mouth every day for 3 days, THEN 0.5 mg 2 (two) times a day for 4 days, THEN 1 mg 2 (two) times a day for 21 days. 1 Each 0    buPROPion SR (WELLBUTRIN-SR) 100 MG TABLET SR 12 HR Take 1 Tablet by mouth 2 times a day for 90 days. (Patient not taking: Reported on 4/24/2024) 180 Tablet 3     No current facility-administered medications for this visit.       Social History     Tobacco Use    Smoking status: Every Day     Current packs/day: 0.50     Types: Cigarettes      "Passive exposure: Never    Smokeless tobacco: Never    Tobacco comments:     Has tried nicotine patches and gum in the past.  Interested in trying again, but would like to wait until he is finished with his divorce.   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  He moved to [place] from [place] 11 years ago.  He is in the process of  his wife of 10 years.       Family History   Problem Relation Age of Onset    Hypertension Father     Diabetes Father        Exam:    BP (!) 149/102 (BP Location: Left arm, Patient Position: Sitting, BP Cuff Size: Adult)   Pulse 66   Temp 36.1 °C (97 °F) (Temporal)   Resp 12   Ht 1.753 m (5' 9\")   Wt 76.7 kg (169 lb)   SpO2 97%   BMI 24.96 kg/m²  Body mass index is 24.96 kg/m².    Gen: Alert and oriented, No apparent distress. Well developed young  male, sitting in office chair.  HEENT: NCAT, MMM  Neck: Supple, FROM  Chest: No deformities, Equal chest expansion  Lungs: Normal effort, CTA bilaterally, no wheezes, rhonchi, or rales  CV: Regular rate and rhythm. No murmurs, rubs, or gallops. Pulse palpable  Abd: Non-distended  Ext: No clubbing, cyanosis, edema.  Skin: Warm/dry, without rashes  Neuro: A/O x 4, CN 2-12 Grossly intact, Motor/sensory grossly intact  Psych: Normal behavior, normal affect.  PHQ-9 score 0      Mehul Johansen M.D.   PGY-1  [facility]      "

## 2024-04-25 NOTE — ASSESSMENT & PLAN NOTE
- Ref[facility] to [facility] [place] [facility], LOTT study  - Ordered once in lifetime HCV/HIV screening

## 2024-04-29 ENCOUNTER — RESEARCH ENCOUNTER (OUTPATIENT)
Dept: RESEARCH | Facility: MEDICAL CENTER | Age: 44
End: 2024-04-29

## 2024-04-29 NOTE — RESEARCH NOTE
Patient has been referred by Mehul Johansen. Sent initial referral follow-up message with instructions to locate and sign consent form(s). The following consent form(s) have been pushed to the patient's MyChart: CLAUS/JORGE

## 2024-05-03 NOTE — RESEARCH NOTE
Call 1x -  msg not read - Called pt and as I was letting him know he had a MyChart message from me he hung up *click*

## 2024-05-16 ENCOUNTER — OFFICE VISIT (OUTPATIENT)
Dept: URGENT CARE | Facility: CLINIC | Age: 44
End: 2024-05-16
Payer: COMMERCIAL

## 2024-05-16 VITALS
RESPIRATION RATE: 16 BRPM | SYSTOLIC BLOOD PRESSURE: 124 MMHG | WEIGHT: 165 LBS | BODY MASS INDEX: 24.44 KG/M2 | DIASTOLIC BLOOD PRESSURE: 82 MMHG | HEART RATE: 100 BPM | OXYGEN SATURATION: 97 % | HEIGHT: 69 IN | TEMPERATURE: 100.5 F

## 2024-05-16 DIAGNOSIS — U07.1 COVID-19: ICD-10-CM

## 2024-05-16 PROCEDURE — 3079F DIAST BP 80-89 MM HG: CPT | Performed by: PHYSICIAN ASSISTANT

## 2024-05-16 PROCEDURE — 3074F SYST BP LT 130 MM HG: CPT | Performed by: PHYSICIAN ASSISTANT

## 2024-05-16 PROCEDURE — 99214 OFFICE O/P EST MOD 30 MIN: CPT | Performed by: PHYSICIAN ASSISTANT

## 2024-05-16 ASSESSMENT — ENCOUNTER SYMPTOMS
COUGH: 1
FEVER: 1
HEADACHES: 1
CHILLS: 0

## 2024-05-16 NOTE — PROGRESS NOTES
Subjective:   Ashley Corrales is a 43 y.o. male who presents today with   Chief Complaint   Patient presents with    Cough     CONGESTION, FEVER, HEADACHE, AT HOME POSITIVE COVID TEST X 1 DAY     Cough  This is a new problem. The current episode started yesterday. The problem has been unchanged. The problem occurs every few minutes. Associated symptoms include a fever and headaches. Pertinent negatives include no chills. Treatments tried: tylenol. The treatment provided no relief.     PMH:  has a past medical history of Apnea, sleep, Daytime sleepiness (4/24/2024), Gasping for breath, Morning headache, JOE (obstructive sleep apnea) (2013), and Snoring.  MEDS:   Current Outpatient Medications:     Nirmatrelvir&Ritonavir 300/100 20 x 150 MG & 10 x 100MG Tablet Therapy Pack, Take 300 mg nirmatrelvir (two 150 mg tablets) with 100 mg ritonavir (one 100 mg tablet) by mouth, with all three tablets taken together twice daily for 5 days., Disp: 30 Each, Rfl: 0    Varenicline Tartrate, Starter, 0.5 MG X 11 & 1 MG X 42 Tablet Therapy Pack, Take 0.5 mg by mouth every day for 3 days, THEN 0.5 mg 2 (two) times a day for 4 days, THEN 1 mg 2 (two) times a day for 21 days., Disp: 1 Each, Rfl: 0  ALLERGIES: No Known Allergies  SURGHX: History reviewed. No pertinent surgical history.  SOCHX:  reports that he has been smoking cigarettes. He has never been exposed to tobacco smoke. He has never used smokeless tobacco. He reports current alcohol use of about 2.4 oz of alcohol per week. He reports that he does not currently use drugs.  FH: Reviewed with patient, not pertinent to this visit.     Review of Systems   Constitutional:  Positive for fever. Negative for chills.   HENT:  Positive for congestion.    Respiratory:  Positive for cough.    Neurological:  Positive for headaches.      Objective:   /82 (BP Location: Left arm, Patient Position: Sitting, BP Cuff Size: Adult)   Pulse 100   Temp (!) 38.1 °C (100.5 °F) (Temporal)    "Resp 16   Ht 1.753 m (5' 9\")   Wt 74.8 kg (165 lb)   SpO2 97%   BMI 24.37 kg/m²   Physical Exam  Vitals and nursing note reviewed.   Constitutional:       General: He is not in acute distress.     Appearance: Normal appearance. He is well-developed. He is not ill-appearing or toxic-appearing.   HENT:      Head: Normocephalic and atraumatic.      Right Ear: Hearing normal.      Left Ear: Hearing normal.      Nose: Congestion present.      Mouth/Throat:      Mouth: Mucous membranes are moist.      Pharynx: No oropharyngeal exudate or posterior oropharyngeal erythema.   Cardiovascular:      Rate and Rhythm: Normal rate and regular rhythm.      Heart sounds: Normal heart sounds.   Pulmonary:      Effort: Pulmonary effort is normal. No respiratory distress.      Breath sounds: Normal breath sounds. No stridor. No wheezing, rhonchi or rales.   Musculoskeletal:      Comments: Normal movement in all 4 extremities   Skin:     General: Skin is warm and dry.   Neurological:      Mental Status: He is alert.      Coordination: Coordination normal.   Psychiatric:         Mood and Affect: Mood normal.       Assessment/Plan:   Assessment    1. COVID-19  - Nirmatrelvir&Ritonavir 300/100 20 x 150 MG & 10 x 100MG Tablet Therapy Pack; Take 300 mg nirmatrelvir (two 150 mg tablets) with 100 mg ritonavir (one 100 mg tablet) by mouth, with all three tablets taken together twice daily for 5 days.  Dispense: 30 Each; Refill: 0    Symptoms and presentation consistent with COVID at this time.  Vital signs are stable on exam today.  Discussed CDC guidelines including self isolation at home.   Patient encouraged to get plenty of rest, use OTC tylenol for pain/fever, and drink plenty of fluids.    Patient would meet risk factors for antiviral medication at this time given his history of smoking as well as sleep apnea and other respiratory and family history of hypertension and diabetes.  He is requesting Paxlovid today and I discussed with " him possible side effects and he is understanding.  He states he has never had any kidney issues or low kidney function.  Offered blood work and retesting for COVID but patient declines today.    Please note that this dictation was created using voice recognition software. I have made every reasonable attempt to correct obvious errors, but I expect that there are errors of grammar and possibly content that I did not discover before finalizing the note.    Dread Diaz PA-C

## 2024-05-21 DIAGNOSIS — F17.200 CURRENT EVERY DAY SMOKER: ICD-10-CM

## 2024-05-21 RX ORDER — VARENICLINE TARTRATE 1 MG/1
1 TABLET, FILM COATED ORAL 2 TIMES DAILY
Qty: 60 TABLET | Refills: 3 | Status: SHIPPED | OUTPATIENT
Start: 2024-05-21

## 2024-05-21 NOTE — ASSESSMENT & PLAN NOTE
Pt started on 28 days of varenicline at last visit. Pt requesting refill. Will order maintenance dosing for varenicline.   - Ordered varenicline 1 mg tabs, 1 tab PO BID  - Reassess at F/U on 5/29

## 2024-05-21 NOTE — PROGRESS NOTES
Orders-Only Encounter Note    Date: 05/21/2024     Assessment/Plan:  Problem List Items Addressed This Visit       Current every day smoker     Pt started on 28 days of varenicline at last visit. Pt requesting refill. Will order maintenance dosing for varenicline.   - Ordered varenicline 1 mg tabs, 1 tab PO BID  - Reassess at F/U on 5/29         Relevant Medications    varenicline (CHANTIX) 1 MG tablet      Orders Placed This Encounter    varenicline (CHANTIX) 1 MG tablet       Future Appointments   Date Time Provider Department Center   5/24/2024  1:20 PM [facility] None   5/29/2024  2:00 PM Mehul Johansen M.D. [facility] [place]   6/17/2024 12:50 PM KEM Weeks [facility] None        Mehul Johansen M.D.   PGY-1  [facility]

## 2024-05-24 ENCOUNTER — HOSPITAL ENCOUNTER (OUTPATIENT)
Dept: LAB | Facility: MEDICAL CENTER | Age: 44
End: 2024-05-24
Payer: COMMERCIAL

## 2024-05-24 ENCOUNTER — HOME STUDY (OUTPATIENT)
Dept: SLEEP MEDICINE | Facility: MEDICAL CENTER | Age: 44
End: 2024-05-24
Attending: STUDENT IN AN ORGANIZED HEALTH CARE EDUCATION/TRAINING PROGRAM
Payer: COMMERCIAL

## 2024-05-24 DIAGNOSIS — R06.83 SNORING: ICD-10-CM

## 2024-05-24 DIAGNOSIS — Z83.3 FAMILY HISTORY OF DIABETES MELLITUS: ICD-10-CM

## 2024-05-24 DIAGNOSIS — Z00.00 HEALTH CARE MAINTENANCE: ICD-10-CM

## 2024-05-24 DIAGNOSIS — G47.19 EXCESSIVE DAYTIME SLEEPINESS: ICD-10-CM

## 2024-05-24 DIAGNOSIS — G47.33 OSA (OBSTRUCTIVE SLEEP APNEA): ICD-10-CM

## 2024-05-24 DIAGNOSIS — R40.0 DAYTIME SLEEPINESS: ICD-10-CM

## 2024-05-24 LAB
ALBUMIN SERPL BCP-MCNC: 4.4 G/DL (ref 3.2–4.9)
ALBUMIN/GLOB SERPL: 1.6 G/DL
ALP SERPL-CCNC: 90 U/L (ref 30–99)
ALT SERPL-CCNC: 26 U/L (ref 2–50)
ANION GAP SERPL CALC-SCNC: 13 MMOL/L (ref 7–16)
AST SERPL-CCNC: 22 U/L (ref 12–45)
BASOPHILS # BLD AUTO: 0.2 % (ref 0–1.8)
BASOPHILS # BLD: 0.02 K/UL (ref 0–0.12)
BILIRUB SERPL-MCNC: 0.7 MG/DL (ref 0.1–1.5)
BUN SERPL-MCNC: 10 MG/DL (ref 8–22)
CALCIUM ALBUM COR SERPL-MCNC: 8.7 MG/DL (ref 8.5–10.5)
CALCIUM SERPL-MCNC: 9 MG/DL (ref 8.5–10.5)
CHLORIDE SERPL-SCNC: 104 MMOL/L (ref 96–112)
CHOLEST SERPL-MCNC: 186 MG/DL (ref 100–199)
CO2 SERPL-SCNC: 22 MMOL/L (ref 20–33)
CREAT SERPL-MCNC: 0.79 MG/DL (ref 0.5–1.4)
EOSINOPHIL # BLD AUTO: 0.06 K/UL (ref 0–0.51)
EOSINOPHIL NFR BLD: 0.7 % (ref 0–6.9)
ERYTHROCYTE [DISTWIDTH] IN BLOOD BY AUTOMATED COUNT: 35.8 FL (ref 35.9–50)
FASTING STATUS PATIENT QL REPORTED: NORMAL
GFR SERPLBLD CREATININE-BSD FMLA CKD-EPI: 113 ML/MIN/1.73 M 2
GLOBULIN SER CALC-MCNC: 2.8 G/DL (ref 1.9–3.5)
GLUCOSE SERPL-MCNC: 90 MG/DL (ref 65–99)
HCT VFR BLD AUTO: 46.7 % (ref 42–52)
HCV AB SER QL: NORMAL
HDLC SERPL-MCNC: 40 MG/DL
HGB BLD-MCNC: 17.4 G/DL (ref 14–18)
HIV 1+2 AB+HIV1 P24 AG SERPL QL IA: NORMAL
IMM GRANULOCYTES # BLD AUTO: 0.08 K/UL (ref 0–0.11)
IMM GRANULOCYTES NFR BLD AUTO: 1 % (ref 0–0.9)
LDLC SERPL CALC-MCNC: 90 MG/DL
LYMPHOCYTES # BLD AUTO: 2.4 K/UL (ref 1–4.8)
LYMPHOCYTES NFR BLD: 28.5 % (ref 22–41)
MCH RBC QN AUTO: 30.9 PG (ref 27–33)
MCHC RBC AUTO-ENTMCNC: 37.3 G/DL (ref 32.3–36.5)
MCV RBC AUTO: 82.8 FL (ref 81.4–97.8)
MONOCYTES # BLD AUTO: 0.46 K/UL (ref 0–0.85)
MONOCYTES NFR BLD AUTO: 5.5 % (ref 0–13.4)
NEUTROPHILS # BLD AUTO: 5.39 K/UL (ref 1.82–7.42)
NEUTROPHILS NFR BLD: 64.1 % (ref 44–72)
NRBC # BLD AUTO: 0 K/UL
NRBC BLD-RTO: 0 /100 WBC (ref 0–0.2)
PLATELET # BLD AUTO: 340 K/UL (ref 164–446)
PMV BLD AUTO: 9.6 FL (ref 9–12.9)
POTASSIUM SERPL-SCNC: 4 MMOL/L (ref 3.6–5.5)
PROT SERPL-MCNC: 7.2 G/DL (ref 6–8.2)
RBC # BLD AUTO: 5.64 M/UL (ref 4.7–6.1)
SODIUM SERPL-SCNC: 139 MMOL/L (ref 135–145)
TRIGL SERPL-MCNC: 281 MG/DL (ref 0–149)
TSH SERPL DL<=0.005 MIU/L-ACNC: 1.88 UIU/ML (ref 0.38–5.33)
WBC # BLD AUTO: 8.4 K/UL (ref 4.8–10.8)

## 2024-05-24 PROCEDURE — 95800 SLP STDY UNATTENDED: CPT | Performed by: STUDENT IN AN ORGANIZED HEALTH CARE EDUCATION/TRAINING PROGRAM

## 2024-05-29 ENCOUNTER — APPOINTMENT (OUTPATIENT)
Dept: MEDICAL GROUP | Facility: CLINIC | Age: 44
End: 2024-05-29
Payer: COMMERCIAL

## 2024-05-30 NOTE — PROCEDURES
DIAGNOSTIC HOME SLEEP TEST (HST) REPORT WatchPAT      PATIENT ID:  NAME:  Ashley Corrales  MRN:               [id number]  YOB: 1980  DATE OF STUDY: 5/25/2024      Impression:     This study shows evidence of:      1. Moderate obstructive sleep apnea with PAT apnea hypopnea index(pAHI) of 22.1 per hour.  PAT respiratory disturbance index (pRDI) was 23.1 per hour. These findings are based on 7 channels recording of PAT signal with sleep staging, heart rate, pulse oximetry, actigraphy, body position, snoring and respiratory movement.     2. Oxygenation O2 Sat. mean O2 sat was 95%,  josey was 90%,  and maximum O2 at 99 %. O2 sat was at or  below 88% for 0 min of evaluation time. Oxygen Desaturation (>=4%) Index was 9.1/hr. AVG HR was 84 BPM.      TECHNICAL DESCRIPTION: Patient underwent home sleep apnea testing with peripheral arterial tone signal (WatchPAT™). This is a Type IV portable monitor and device per Medicare. Monitoring was done with 7 channels recording of PAT signal with sleep staging, heart rate, pulse oximetry, actigraphy, body position, snoring and respiratory movement. Prior to using the device, the patient received verbal and written instructions for its application and was provided with the help desk phone number for additional telephonic instruction with 24-hour availability of qualified personnel to answer questions.    Respiratory events:        General sleep summary: . Total recording time is 4 hours and 40 minutes and total Sleep time is 4 hours and 23 minutes. The patient spent 63 minutes in the supine position and 201 minutes in the nonsupine position.      Recommendations:    1. CPAP titration study vs Auto CPAP trial.   2. In general patients with sleep apnea are advised to avoid alcohol and sedatives and to not operate a motor vehicle while drowsy. In some cases alternative treatment options may prove effective in resolving sleep apnea in these options include upper  airway surgery, the use of a dental orthotic or weight loss and positional therapy. Clinical correlation is required.         Jose Juan Rene MD

## 2024-06-17 ENCOUNTER — OFFICE VISIT (OUTPATIENT)
Dept: SLEEP MEDICINE | Facility: MEDICAL CENTER | Age: 44
End: 2024-06-17
Attending: NURSE PRACTITIONER
Payer: COMMERCIAL

## 2024-06-17 VITALS
WEIGHT: 160 LBS | DIASTOLIC BLOOD PRESSURE: 84 MMHG | OXYGEN SATURATION: 97 % | SYSTOLIC BLOOD PRESSURE: 122 MMHG | HEART RATE: 104 BPM | RESPIRATION RATE: 16 BRPM | BODY MASS INDEX: 25.11 KG/M2 | HEIGHT: 67 IN

## 2024-06-17 DIAGNOSIS — Z87.891 FORMER SMOKER: ICD-10-CM

## 2024-06-17 DIAGNOSIS — Z23 NEED FOR VACCINATION: ICD-10-CM

## 2024-06-17 DIAGNOSIS — G47.33 OSA (OBSTRUCTIVE SLEEP APNEA): ICD-10-CM

## 2024-06-17 PROBLEM — F17.200 CURRENT EVERY DAY SMOKER: Status: RESOLVED | Noted: 2023-08-16 | Resolved: 2024-06-17

## 2024-06-17 PROCEDURE — 90471 IMMUNIZATION ADMIN: CPT | Performed by: NURSE PRACTITIONER

## 2024-06-17 PROCEDURE — 3074F SYST BP LT 130 MM HG: CPT | Performed by: NURSE PRACTITIONER

## 2024-06-17 PROCEDURE — 99213 OFFICE O/P EST LOW 20 MIN: CPT | Performed by: NURSE PRACTITIONER

## 2024-06-17 PROCEDURE — 3079F DIAST BP 80-89 MM HG: CPT | Performed by: NURSE PRACTITIONER

## 2024-06-17 PROCEDURE — 99212 OFFICE O/P EST SF 10 MIN: CPT | Performed by: NURSE PRACTITIONER

## 2024-06-17 ASSESSMENT — FIBROSIS 4 INDEX: FIB4 SCORE: 0.55

## 2024-06-17 NOTE — PROGRESS NOTES
Chief Complaint   Patient presents with    Apnea     Last Office Visit 4/8/2024 with DR. MARSH     Sleep Study Complete on 5/24/2024       HPI:  Ashley Corrales is a 43 y.o. year old male here today for follow-up on sleep study results.  Last OV 4/8/24     HST with WatchPAT 5/24/24:  1. Moderate obstructive sleep apnea with PAT apnea hypopnea index(pAHI) of 22.1 per hour.  PAT respiratory disturbance index (pRDI) was 23.1 per hour. These findings are based on 7 channels recording of PAT signal with sleep staging, heart rate, pulse oximetry, actigraphy, body position, snoring and respiratory movement.      2. Oxygenation O2 Sat. mean O2 sat was 95%,  josey was 90%,  and maximum O2 at 99 %. O2 sat was at or  below 88% for 0 min of evaluation time. Oxygen Desaturation (>=4%) Index was 9.1/hr. AVG HR was 84 BPM.  Reviewed with patient all treatment modalities.    Interval events:  6/17/2024: Patient notes trying and failing CPAP and oral appliance.  He had an oral appliance device made and created use for the last 3 years been having significant TMJ issues.  He is not overweight.  He would like to pursue inspire implant therapy.  He denies any cardiac or respiratory symptoms during the day.  He notes ongoing sleep symptoms and would like to sleep better.    Sleep history:  Originally diagnosed in [place] more than 10 years ago.  He initially tried a Derik Respironics machine and had difficulty using it with significant aerophagia.  Tried multiple masks and lip tape.  He even tried an auto BiPAP setting without benefit.  He returned for evaluation due to 6 nonrestorative sleep and ongoing fatigue during the day.  He notes irritability.  He wakes up with dry mouth occasionally has been told in the past he stops breathing while sleeping.      ROS: As per HPI and otherwise negative if not stated.    Past Medical History:   Diagnosis Date    Apnea, sleep     Daytime sleepiness 4/24/2024    Gasping for breath     Morning  headache     JOE (obstructive sleep apnea) 2013    Snoring        History reviewed. No pertinent surgical history.    Family History   Problem Relation Age of Onset    Hypertension Father     Diabetes Father        Social History     Socioeconomic History    Marital status: Unknown     Spouse name: Not on file    Number of children: Not on file    Years of education: Not on file    Highest education level: Doctorate   Occupational History    Not on file   Tobacco Use    Smoking status: Former     Current packs/day: 0.50     Types: Cigarettes     Passive exposure: Never    Smokeless tobacco: Never    Tobacco comments:     Has tried nicotine patches and gum in the past.  Interested in trying again, but would like to wait until he is finished with his divorce.   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  He moved to [place] from [place] [facility] 11 years ago.  He is in the process of  his wife of 10 years.     Social Determinants of Health     Financial Resource Strain: Low Risk  (8/15/2023)    Overall Financial Resource Strain (CARDIA)     Difficulty of Paying Living Expenses: Not hard at all   Food Insecurity: No Food Insecurity (8/15/2023)    Hunger Vital Sign     Worried About Running Out of Food in the Last Year: Never true     Ran Out of Food in the Last Year: Never true   Transportation Needs: No Transportation Needs (8/15/2023)    PRAPARE - Transportation     Lack of Transportation (Medical): No     Lack of Transportation (Non-Medical): No   Physical Activity: Insufficiently Active (8/15/2023)    Exercise Vital Sign     Days of Exercise per Week: 1 day     Minutes of Exercise per Session: 40 min   Stress: Stress Concern Present  "(8/15/2023)    [place] [employer] - Occupational Stress Questionnaire     Feeling of Stress : Very much   Social Connections: Socially Isolated (8/15/2023)    Social Connection and Isolation Panel [NHANES]     Frequency of Communication with Friends and Family: Once a week     Frequency of Social Gatherings with Friends and Family: Once a week     Attends Church Services: Never     Active Member of Clubs or Organizations: No     Attends Club or Organization Meetings: Never     Marital Status:    Intimate Partner Violence: Not on file   Housing Stability: Low Risk  (8/15/2023)    Housing Stability Vital Sign     Unable to Pay for Housing in the Last Year: No     Number of Places Lived in the Last Year: 2     Unstable Housing in the Last Year: No       Allergies as of 06/17/2024    (No Known Allergies)        Vitals:  /84 (BP Location: Left arm, Patient Position: Sitting, BP Cuff Size: Adult)   Pulse (!) 104   Resp 16   Ht 1.702 m (5' 7\")   Wt 72.6 kg (160 lb)   SpO2 97%     Current medications as of today   Current Outpatient Medications   Medication Sig Dispense Refill    varenicline (CHANTIX) 1 MG tablet Take 1 Tablet by mouth 2 times a day. 60 Tablet 3    Nirmatrelvir&Ritonavir 300/100 20 x 150 MG & 10 x 100MG Tablet Therapy Pack Take 300 mg nirmatrelvir (two 150 mg tablets) with 100 mg ritonavir (one 100 mg tablet) by mouth, with all three tablets taken together twice daily for 5 days. 30 Each 0     No current facility-administered medications for this visit.         Physical Exam:   Gen:           Alert and oriented, No apparent distress. Mood and affect appropriate, normal interaction with examiner.  Eyes:          PERRL, EOM intact, sclere white, conjunctive moist. Glasses.  Ears:          Not examined.   Hearing:     Grossly intact.  Nose:          Normal, no lesions or deformities.  Dentition:    Not examined.   Oropharynx:   Not examined.   Mallampati " Classification: Not examined.   Neck:        Supple, trachea midline, no masses.  Respiratory Effort: No intercostal retractions or use of accessory muscles.   Lung Auscultation:      Clear to auscultation bilaterally; no rales, rhonchi or wheezing.  CV:            Regular rate and rhythm. No murmurs, rubs or gallops.  Abd:           Not examined.   Lymphadenopathy: Not examined.  Gait and Station: Normal.  Digits and Nails: No clubbing, cyanosis, petechiae, or nodes.   Cranial Nerves: II-XII grossly intact.  Skin:        No rashes, lesions or ulcers noted.               Ext:           No cyanosis or edema.      Assessment:  1. JOE (obstructive sleep apnea)        2. BMI 25.0-25.9,adult        3. Former smoker            Immunizations:    Flu:recommend fall 2024  Pneumovax 23:not due  Prevnar 13:not due  PCV 20: given today  COVID-19: 2021    Plan:  Moderate struct of sleep apnea.  He has tried and failed CPAP and oral appliance therapy.  He is not obese.  No significant sinus issues.  Patient like to pursue inspire implant therapy.  Recommend in lab PSG per insurance guidelines for qualification.  Pending results will refer to ENT for implantation therapy.   PSG now   Prevnar 20 given  Encouraged continued healthy diet and activity for conditioning.  Follow-up in 1 month with any sleep provider for sleep results and referral to ENT for inspire implant, sooner if needed.    Please note that this dictation was created using voice recognition software. I have made every reasonable attempt to correct obvious errors, but it is possible there are errors of grammar and possibly content that I did not discover before finalizing the note.

## 2024-07-18 ENCOUNTER — SLEEP STUDY (OUTPATIENT)
Dept: SLEEP MEDICINE | Facility: MEDICAL CENTER | Age: 44
End: 2024-07-18
Attending: NURSE PRACTITIONER
Payer: COMMERCIAL

## 2024-07-18 DIAGNOSIS — G47.33 OSA (OBSTRUCTIVE SLEEP APNEA): ICD-10-CM

## 2024-07-18 PROCEDURE — 95810 POLYSOM 6/> YRS 4/> PARAM: CPT | Performed by: PREVENTIVE MEDICINE

## 2024-07-19 NOTE — PROCEDURES
Physician:   Patient: GROVER AKBAR  ID: [id number] Date: 7/18/2024 Exam No.:   MONTAGE: Standard  STUDY TYPE: Diagnostic  RECORDING TECHNIQUE:   After the scalp was prepared, gold plated electrodes were applied to the scalp according to the International 10-20 System. EEG (electroencephalogram) was continuously monitored from the O1-M2, O2-M1, C3-M2, C4-M1, F3-M2, and F4-M1. EOGs (electrooculograms) were monitored by electrodes placed at the left and right outer canthi. Chin EMG (electromyogram) was monitored by electrodes placed on the mentalis and sub-mentalis muscles. Nasal and oral airflow were monitored using a triple port thermocouple as well as oronasal pressure transducer. Respiratory effort was measured by inductive plethysmography technology employing abdominal and thoracic belts. Blood oxygen saturation and pulse were monitored by pulse oximetry. Heart rhythm was monitored by surface electrocardiogram. Leg EMG was studied using surface electrodes placed on left and right anterior tibialis. A microphone was used to monitor tracheal sounds and snoring. Body position was monitored and documented by technician observation.   SCORING CRITERIA:   A modification of the AASM manual for scoring of sleep and associated events was used. Obstructive apneas were scored by cessation of airflow for at least 10 seconds with continuing respiratory effort. Central apneas were scored by cessation of airflow for at least 10 seconds with no respiratory effort. Hypopneas were scored by a 30% or more reduction in airflow for at least 10 seconds accompanied by arterial oxygen desaturation of 3% or an arousal. For CMS (Medicare) patients, per AASM rule 1B, hypopneas are scored by 30% with mild reduction in airflow for at least 10 seconds accompanied by arterial saturation decreased at 4%.  Study start time was 10:52:51 PM. Diagnostic recording time was 6h 57.0m with a total sleep time of 3h 30.5m resulting in a sleep efficiency of  50.48%%. Sleep latency from the start of the study was 01 minutes and the latency from sleep to REM was 17 minutes. In total,278 arousals were scored for an arousal index of 79.2.  Respiratory:  There were a total of 308 apneas consisting of 276 obstructive apneas, 0 mixed apneas, and 32 central apneas. A total of 23 hypopneas were scored. The apnea index was 87.79 per hour and the hypopnea index was 6.56 per hour resulting in an overall AHI of 94.35. AHI during REM was 95.7 and AHI while supine was 113.84.  Oximetry:  There was a mean oxygen saturation of 95.0%. The minimum oxygen saturation in NREM was 89.0 % and in REM was 88.0%. The patient spent 0.0 minutes of TST with SaO2 <88%.  Cardiac:  The highest heart rate seen while awake was 94 BPM while the highest heart rate during sleep was 92 BPM with an average sleeping heart rate of 73 BPM.  Limb Movements:  There were a total of 0 PLMs during sleep which resulted in a PLMS index of 0.0. Of these, 1 were associated with arousals which resulted in a PLMS arousal index of 0.3.  Assessment:   ***Obstructive Sleep Apnea Hypopnea - AHI*** ***Nocturnal desaturation - josey saturation ***% - saturations <88% below for *** minutes of TST.  Recommendation:

## 2024-08-12 ENCOUNTER — RESEARCH ENCOUNTER (OUTPATIENT)
Dept: MEDICAL GROUP | Facility: CLINIC | Age: 44
End: 2024-08-12
Payer: COMMERCIAL

## 2024-08-12 ENCOUNTER — APPOINTMENT (OUTPATIENT)
Dept: MEDICAL GROUP | Facility: CLINIC | Age: 44
End: 2024-08-12
Payer: COMMERCIAL

## 2024-08-12 VITALS
DIASTOLIC BLOOD PRESSURE: 106 MMHG | RESPIRATION RATE: 20 BRPM | OXYGEN SATURATION: 95 % | TEMPERATURE: 99.4 F | BODY MASS INDEX: 27.11 KG/M2 | HEART RATE: 80 BPM | WEIGHT: 183 LBS | SYSTOLIC BLOOD PRESSURE: 143 MMHG | HEIGHT: 69 IN

## 2024-08-12 DIAGNOSIS — R40.0 DAYTIME SLEEPINESS: ICD-10-CM

## 2024-08-12 DIAGNOSIS — F32.9 CURRENT EPISODE OF MAJOR DEPRESSIVE DISORDER WITHOUT PRIOR EPISODE, UNSPECIFIED DEPRESSION EPISODE SEVERITY: ICD-10-CM

## 2024-08-12 DIAGNOSIS — E78.1 HYPERTRIGLYCERIDEMIA WITHOUT HYPERCHOLESTEROLEMIA: ICD-10-CM

## 2024-08-12 DIAGNOSIS — R03.0 ELEVATED BLOOD PRESSURE READING IN OFFICE WITHOUT DIAGNOSIS OF HYPERTENSION: ICD-10-CM

## 2024-08-12 DIAGNOSIS — Z00.6 RESEARCH STUDY PATIENT: ICD-10-CM

## 2024-08-12 DIAGNOSIS — G47.33 OSA (OBSTRUCTIVE SLEEP APNEA): ICD-10-CM

## 2024-08-12 DIAGNOSIS — F17.211 TOBACCO DEPENDENCE DUE TO CIGARETTES, IN REMISSION: ICD-10-CM

## 2024-08-12 PROCEDURE — 99213 OFFICE O/P EST LOW 20 MIN: CPT | Mod: GE

## 2024-08-12 PROCEDURE — 3077F SYST BP >= 140 MM HG: CPT | Mod: GE

## 2024-08-12 PROCEDURE — 3080F DIAST BP >= 90 MM HG: CPT | Mod: GE

## 2024-08-12 ASSESSMENT — PATIENT HEALTH QUESTIONNAIRE - PHQ9
9. THOUGHTS THAT YOU WOULD BE BETTER OFF DEAD, OR OF HURTING YOURSELF: NOT AT ALL
4. FEELING TIRED OR HAVING LITTLE ENERGY: NOT AT ALL
CLINICAL INTERPRETATION OF PHQ2 SCORE: 0
7. TROUBLE CONCENTRATING ON THINGS, SUCH AS READING THE NEWSPAPER OR WATCHING TELEVISION: NOT AT ALL
3. TROUBLE FALLING OR STAYING ASLEEP OR SLEEPING TOO MUCH: NOT AT ALL
6. FEELING BAD ABOUT YOURSELF - OR THAT YOU ARE A FAILURE OR HAVE LET YOURSELF OR YOUR FAMILY DOWN: NOT AL ALL
8. MOVING OR SPEAKING SO SLOWLY THAT OTHER PEOPLE COULD HAVE NOTICED. OR THE OPPOSITE, BEING SO FIGETY OR RESTLESS THAT YOU HAVE BEEN MOVING AROUND A LOT MORE THAN USUAL: NOT AT ALL
SUM OF ALL RESPONSES TO PHQ QUESTIONS 1-9: 0
1. LITTLE INTEREST OR PLEASURE IN DOING THINGS: NOT AT ALL
2. FEELING DOWN, DEPRESSED, IRRITABLE, OR HOPELESS: NOT AT ALL
SUM OF ALL RESPONSES TO PHQ9 QUESTIONS 1 AND 2: 0
5. POOR APPETITE OR OVEREATING: NOT AT ALL

## 2024-08-12 ASSESSMENT — FIBROSIS 4 INDEX: FIB4 SCORE: 0.55

## 2024-08-12 NOTE — ASSESSMENT & PLAN NOTE
Successful smoking cessation with Chantix.  No cigarette use for about 3 months.  - Continue to monitor, may restart Chantix if relapse occurs

## 2024-08-12 NOTE — PROGRESS NOTES
This note is formatted in an APSO format, for additional subjective and objective evaluation please scroll to the bottom of the note.    CC:  Chief Complaint   Patient presents with    Follow-Up     Review Lab work in May       Assessment/Plan:  Problem List Items Addressed This Visit       Elevated blood pressure reading in office without diagnosis of hypertension     Elevated BP in office last 2 visits, with normal readings with home BP cuff.  - Continue home BP log, instructed to bring results to next appointment  - Follow-up in 3 to 6 months with home BP log         JOE (obstructive sleep apnea)     Minimal symptomatic improvement since last visit.  Had home sleep study followed by lab polysomnography, apnea index 87.79/h.  Following up on 8/27 with sleep medicine to discuss next steps.  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  - Follow-up with sleep medicine as scheduled  - Follow-up in 3 to 6 months  - May repeat hemoglobin at next visit to watch for improvement once JOE is better controlled         RESOLVED: MDD (major depressive disorder)     Depression appears resolved, in remission for over 4 months.  - Rescreen if symptoms worsen         Tobacco dependence due to cigarettes, in remission     Successful smoking cessation with Chantix.  No cigarette use for about 3 months.  - Continue to monitor, may restart Chantix if relapse occurs         Daytime sleepiness     Excess daytime sleepiness, slightly improved from initial exam.  Most likely due to poorly controlled JOE.  Recent polysomnography July 2024 confirms high apnea index of 87 events per hour.  - Follow-up with sleep medicine as scheduled on 8/27  - Monitor for improvement in daytime sleepiness with better control of JOE  - Follow-up in 3 to 6 months         Hypertriglyceridemia without hypercholesterolemia     Elevated triglycerides 281 on fasting lipid panel in May 2024.  Patient confirms  fasting status.  - Repeat lipid panel, cannot repeat today in office as patient is not fasting  - Consider medication management if lipids remain elevated, fibrate versus statin  - Follow-up in 3 to 6 months         Relevant Orders    Lipid Profile      Orders Placed This Encounter    Lipid Profile       Return in about 6 months (around 2/12/2025) for BP, JOE f/u.    Future Appointments   Date Time Provider Department Center   8/27/2024  2:50 PM BRYANT Weeks. [facility] None   8/29/2024 10:15 AM LAB [facility]   8/29/2024 10:15 AM RESEARCH GENETICS DRAW - [facility] Demetri[facility] Tatum        LABS: Results reviewed and discussed with the patient, questions answered.    HISTORY OF PRESENT ILLNESS: Patient is a 43 y.o. male established patient who presents today with:    Problem   Hypertriglyceridemia Without Hypercholesterolemia    08/12/2024 labs from May 2024 show fasting lipid panel with elevated triglycerides 281, cholesterol WNL.  Patient confirms he was fasting at the time of lab draw.  No known family history of familial hypercholesterolemia.     Daytime Sleepiness    4/24/2024.  Has diagnosis of JOE from 13 years ago in [place], has an old CPAP machine at home which he bought himself here, similar to the one he had in [place]. He has not used it in about a year as he states this is not helping him and he feels more tired during the day when he uses it. He saw sleep medicine on 4/8/2024 and they ordered a home sleep study, but he has not been sent the supplies for it yet. His father and grandfather also have JOE, and he thinks he has had symptoms of JOE since middle school. However, he was not diagnosed until age 30 while serving in the [employer] .     08/12/2024 States symptoms are about the same as prior appointment in April 2024. Has to take naps during the day about 3 days per week. Still having to pull over to sleep during long drives, 3+ hours.      Elevated Blood Pressure  Reading in Office Without Diagnosis of Hypertension    Father with hypertension, no history of CAD or CVA. Used BP cuff at pharmacy, noticed number was high around 150/95. Complaining of headaches, and extremity tingling when he feels like his BP was high. Never taken any BP meds in the past. On follow-up, blood pressures at home are in the 120s to 130s systolic. On follow up 04/24/2024, BP is 149/102 in office. However, pt has been checking a few times per week and they are usually 110s over 90s.     08/12/2024 again, office reading 143/106.  Patient has home cuff that has been giving values in the normal range.     Gianfranco (Obstructive Sleep Apnea)    Diagnosed in [place].  Started using CPAP again for the past month because he was waking up with headaches. Morning headaches improved with CPAP overnight. Last sleep study +10 years ago.    08/12/2024 Had home sleep study in May which was positive. Had lab sleep study in July which showed Apnea index 87.79. States he has an extensive family history of GIANFRANCO, which has been refractory to all treatments for both his father and grandfather.  Still trying to use CPAP at night but not feeling much improvement. Next f/u appt is 8/27/2024.       Tobacco Dependence Due to Cigarettes, in Remission    10 pack-year history, 0.5 PPD for 20 yrs. Has tried nicotine patch, caused dizziness. Has tried nicotine gum, it was too spicy.  Trial of Chantix in May 2024, able to stop smoking within 2 weeks.    08/12/2024 has not smoked since Chantix use since May, currently not on any medication     Mdd (Major Depressive Disorder) (Resolved)    Patient feeling increased depression anxiety as he is in the process of divorce for the last 3 months.  No prior history of mental health symptoms, no family history, no suicidal ideation.  He is now waking up with headaches, tingling. PHQ-9 20.  SHANTA-7 17.  Patient requesting medication and mental health referral.    On follow-up, symptoms improved with  "bupropion.  PHQ-9 16.  Did meet with psychology, but has not followed up since intake visit as he went to [place] to visit family and then has been busy with the new school year.  Is reporting some side effects, including insomnia (see #insomnia), decreased morning erections, decreased desire for tobacco smoking.    On follow up 04/24/2024, he feels his depression symptoms have significantly improved. Says \"I can do everything again,\" getting all of his work done without limitations, saying \"I feel normal again\" besides the excessive daytime sleepiness. PHQ-9 score is zero.     08/12/2024 just returned from a trip to [facility] with his family.  PHQ-9 score again 0.         1. Current episode of major depressive disorder without prior episode, unspecified depression episode severity        2. JOE (obstructive sleep apnea)        3. Daytime sleepiness        4. Hypertriglyceridemia without hypercholesterolemia  Lipid Profile      5. Elevated blood pressure reading in office without diagnosis of hypertension        6. Tobacco dependence due to cigarettes, in remission            Patient Active Problem List    Diagnosis Date Noted    Hypertriglyceridemia without hypercholesterolemia 08/12/2024    Daytime sleepiness 04/24/2024    Family history of diabetes mellitus 04/24/2024    Health care maintenance 04/24/2024    Insomnia 09/15/2023    Elevated blood pressure reading in office without diagnosis of hypertension 08/16/2023    JOE (obstructive sleep apnea) 08/16/2023    Tobacco dependence due to cigarettes, in remission 08/16/2023      Allergies:Patient has no known allergies.    Current Outpatient Medications   Medication Sig Dispense Refill    varenicline (CHANTIX) 1 MG tablet Take 1 Tablet by mouth 2 times a day. 60 Tablet 3    Nirmatrelvir&Ritonavir 300/100 20 x 150 MG & 10 x 100MG Tablet Therapy Pack Take 300 mg nirmatrelvir (two 150 mg tablets) with 100 mg ritonavir (one 100 mg tablet) by mouth, with all three tablets " "taken together twice daily for 5 days. 30 Each 0     No current facility-administered medications for this visit.       Social History     Tobacco Use    Smoking status: Former     Current packs/day: 0.50     Types: Cigarettes     Passive exposure: Never    Smokeless tobacco: Never    Tobacco comments:     Has tried nicotine patches and gum in the past.  Interested in trying again, but would like to wait until he is finished with his divorce.   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  He moved to [place] from [facility] 11 years ago.  He is in the process of  his wife of 10 years.       Family History   Problem Relation Age of Onset    Hypertension Father     Diabetes Father        Exam:    BP (!) 143/106   Pulse 80   Temp 37.4 °C (99.4 °F) (Temporal)   Resp 20   Ht 1.753 m (5' 9\")   Wt 83 kg (183 lb)   SpO2 95%   BMI 27.02 kg/m²  Body mass index is 27.02 kg/m².    Gen: Alert and oriented, No apparent distress. Well developed friendly young  male sitting in office chair.  HEENT: NCAT, MMM  Neck: Supple, FROM  Chest: No deformities, Equal chest expansion  Lungs: Normal effort, CTA bilaterally, no wheezes, rhonchi, or rales  CV: Regular rate and rhythm. No murmurs, rubs, or gallops. Pulse palpable  Abd: Non-distended  Ext: No clubbing, cyanosis, edema.  Skin: Warm/dry, without rashes  Neuro: A/O x 4, CN 2-12 Grossly intact, Motor/sensory grossly intact  Psych: Normal behavior, normal affect      Mehul Johansen M.D.   PGY-2  [employer] [facility]      "

## 2024-08-12 NOTE — ASSESSMENT & PLAN NOTE
Minimal symptomatic improvement since last visit.  Had home sleep study followed by lab polysomnography, apnea index 87.79/h.  Following up on 8/27 with sleep medicine to discuss next steps.  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  - Follow-up with sleep medicine as scheduled  - Follow-up in 3 to 6 months  - May repeat hemoglobin at next visit to watch for improvement once JOE is better controlled

## 2024-08-12 NOTE — ASSESSMENT & PLAN NOTE
Excess daytime sleepiness, slightly improved from initial exam.  Most likely due to poorly controlled JOE.  Recent polysomnography July 2024 confirms high apnea index of 87 events per hour.  - Follow-up with sleep medicine as scheduled on 8/27  - Monitor for improvement in daytime sleepiness with better control of JOE  - Follow-up in 3 to 6 months

## 2024-08-12 NOTE — ASSESSMENT & PLAN NOTE
Elevated BP in office last 2 visits, with normal readings with home BP cuff.  - Continue home BP log, instructed to bring results to next appointment  - Follow-up in 3 to 6 months with home BP log

## 2024-08-12 NOTE — RESEARCH NOTE
Confirmed with the participant which designated provider they would like study results shared with (Mehul Johansen). Patient will have an opportunity to share the results with any providers of their choosing in the future by accessing their results from [facility].

## 2024-08-12 NOTE — ASSESSMENT & PLAN NOTE
Elevated triglycerides 281 on fasting lipid panel in May 2024.  Patient confirms fasting status.  - Repeat lipid panel, cannot repeat today in office as patient is not fasting  - Consider medication management if lipids remain elevated, fibrate versus statin  - Follow-up in 3 to 6 months

## 2024-08-15 DIAGNOSIS — G47.33 OSA (OBSTRUCTIVE SLEEP APNEA): ICD-10-CM

## 2024-08-27 ENCOUNTER — OFFICE VISIT (OUTPATIENT)
Dept: SLEEP MEDICINE | Facility: MEDICAL CENTER | Age: 44
End: 2024-08-27
Attending: NURSE PRACTITIONER
Payer: COMMERCIAL

## 2024-08-27 VITALS
HEART RATE: 104 BPM | BODY MASS INDEX: 25.34 KG/M2 | OXYGEN SATURATION: 95 % | SYSTOLIC BLOOD PRESSURE: 122 MMHG | HEIGHT: 70 IN | DIASTOLIC BLOOD PRESSURE: 72 MMHG | WEIGHT: 177 LBS | RESPIRATION RATE: 14 BRPM

## 2024-08-27 DIAGNOSIS — G47.33 OSA (OBSTRUCTIVE SLEEP APNEA): ICD-10-CM

## 2024-08-27 DIAGNOSIS — Z87.891 FORMER SMOKER: ICD-10-CM

## 2024-08-27 PROCEDURE — 3074F SYST BP LT 130 MM HG: CPT | Performed by: NURSE PRACTITIONER

## 2024-08-27 PROCEDURE — 99213 OFFICE O/P EST LOW 20 MIN: CPT | Performed by: NURSE PRACTITIONER

## 2024-08-27 PROCEDURE — 99211 OFF/OP EST MAY X REQ PHY/QHP: CPT | Performed by: NURSE PRACTITIONER

## 2024-08-27 PROCEDURE — 3078F DIAST BP <80 MM HG: CPT | Performed by: NURSE PRACTITIONER

## 2024-08-27 ASSESSMENT — FIBROSIS 4 INDEX: FIB4 SCORE: 0.55

## 2024-08-27 NOTE — PATIENT INSTRUCTIONS
ELISA SALDIVAR  [address] [place] [address]  Phone: [phone]     Patient Care Coordination notes:  Referral faxed

## 2024-08-27 NOTE — PROGRESS NOTES
Chief Complaint   Patient presents with    Follow-Up       Last Office Visit 4/8/2024 with DR. MARSH     Sleep Study Complete on 5/24/2024        HPI:  Ashley Corrales is a 43 y.o. year old male here today for follow-up on sleep study results.  Last OV 6/17/24     PSG 7/18/24:  Sleep efficiency 50%.  Assessment:   Severe Obstructive Sleep Apnea Hypopnea - AHI 94.3.  No significant Nocturnal oxygen desaturation - josey saturation 88% - saturations <88% below for 2.0 minutes of TST.  Recommendation:   Due to the severity of the JOE, this patient will need to undergo an in lab titration study that will include titration on CPAP, BIPAP and ASV if the patient is eligible.    Reviewed with patient - he is eligible for Inspire Implant and would like to pursue this due to intolerance to CPAP and OAT.    Interval events:  8/27/24: Denies any changes in health since last office visit.  He is anxious to pursue implant therapy.  He would like his daytime sleepiness to improve.  6/17/2024: Patient notes trying and failing CPAP and oral appliance.  He had an oral appliance device made and created use for the last 3 years been having significant TMJ issues.  He is not overweight.  He would like to pursue inspire implant therapy.  He denies any cardiac or respiratory symptoms during the day.  He notes ongoing sleep symptoms and would like to sleep better.       Sleep history:  Originally diagnosed in [place] more than 10 years ago.  He initially tried a Derik Respironics machine and had difficulty using it with significant aerophagia.  Tried multiple masks and lip tape.  He even tried an auto BiPAP setting without benefit.  He returned for evaluation due to 6 nonrestorative sleep and ongoing fatigue during the day.  He notes irritability.  He wakes up with dry mouth occasionally has been told in the past he stops breathing while sleeping.    HST with [facility] 5/24/24:  1. Moderate obstructive sleep apnea with PAT apnea hypopnea  index(pAHI) of 22.1 per hour.  PAT respiratory disturbance index (pRDI) was 23.1 per hour. These findings are based on 7 channels recording of PAT signal with sleep staging, heart rate, pulse oximetry, actigraphy, body position, snoring and respiratory movement.      2. Oxygenation O2 Sat. mean O2 sat was 95%,  josey was 90%,  and maximum O2 at 99 %. O2 sat was at or  below 88% for 0 min of evaluation time. Oxygen Desaturation (>=4%) Index was 9.1/hr. AVG HR was 84 BPM.  Reviewed with patient all treatment modalities.         ROS: As per HPI and otherwise negative if not stated.    Past Medical History:   Diagnosis Date    Apnea, sleep     Daytime sleepiness 2024    Gasping for breath     MDD (major depressive disorder) 2023    Patient feeling increased depression anxiety as he is in the process of divorce for the last 3 months.  No prior history of mental health symptoms, no family history, no suicidal ideation.  He is now waking up with headaches, tingling. PHQ-9 20.  SHANTA-7 17.  Patient requesting medication and mental health referral.     On follow-up, symptoms improved with bupropion.  PHQ-9 16.  Did meet with psycho    Morning headache     JOE (obstructive sleep apnea)     Snoring        History reviewed. No pertinent surgical history.    Family History   Problem Relation Age of Onset    Hypertension Father     Diabetes Father        Social History     Socioeconomic History    Marital status: Unknown     Spouse name: Not on file    Number of children: Not on file    Years of education: Not on file    Highest education level: Doctorate   Occupational History    Not on file   Tobacco Use    Smoking status: Former     Current packs/day: 0.00     Average packs/day: 0.5 packs/day for 25.3 years (12.7 ttl pk-yrs)     Types: Cigarettes     Start date:      Quit date: 2024     Years since quittin.3     Passive exposure: Never    Smokeless tobacco: Never    Tobacco comments:     Successfully  quit with Chantix in May 2024   Vaping Use    Vaping status: Never Used   Substance and Sexual Activity    Alcohol use: Yes     Alcohol/week: 2.4 oz     Types: 4 Glasses of wine per week    Drug use: Not Currently    Sexual activity: Not on file   Other Topics Concern    Not on file   Social History Narrative    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  He moved to [place] from [facility] 11 years ago.  He is in the process of  his wife of 10 years.     Social Determinants of Health     Financial Resource Strain: Low Risk  (8/15/2023)    Overall Financial Resource Strain (CARDIA)     Difficulty of Paying Living Expenses: Not hard at all   Food Insecurity: No Food Insecurity (8/15/2023)    Hunger Vital Sign     Worried About Running Out of Food in the Last Year: Never true     Ran Out of Food in the Last Year: Never true   Transportation Needs: No Transportation Needs (8/15/2023)    PRAPARE - Transportation     Lack of Transportation (Medical): No     Lack of Transportation (Non-Medical): No   Physical Activity: Insufficiently Active (8/15/2023)    Exercise Vital Sign     Days of Exercise per Week: 1 day     Minutes of Exercise per Session: 40 min   Stress: Stress Concern Present (8/15/2023)    [place] [employer] - Occupational Stress Questionnaire     Feeling of Stress : Very much   Social Connections: Socially Isolated (8/15/2023)    Social Connection and Isolation Panel [NHANES]     Frequency of Communication with Friends and Family: Once a week     Frequency of Social Gatherings with Friends and Family: Once a week     Attends Sabianist Services: Never     Active Member of Clubs or Organizations: No     Attends Club or Organization Meetings: Never     Marital Status:    Intimate Partner Violence: Not on file   Housing Stability: Low Risk  (8/15/2023)    Housing Stability Vital Sign     Unable to Pay for Housing in the Last Year: No     Number of Places Lived in the Last  "Year: 2     Unstable Housing in the Last Year: No       Allergies as of 08/27/2024    (No Known Allergies)        Vitals:  /72 (BP Location: Left arm, Patient Position: Sitting, BP Cuff Size: Adult)   Pulse (!) 104   Resp 14   Ht 1.778 m (5' 10\")   Wt 80.3 kg (177 lb)   SpO2 95%     Current medications as of today   No current outpatient medications on file.     No current facility-administered medications for this visit.         Physical Exam:   Gen:           Alert and oriented, No apparent distress. Mood and affect appropriate, normal interaction with examiner.  Eyes:          PERRL, EOM intact, sclere white, conjunctive moist.  Ears:          Not examined.   Hearing:     Grossly intact.  Nose:          Normal, no lesions or deformities.  Dentition:    Not examined.   Oropharynx:   Not examined.   Mallampati Classification: Not examined.   Neck:        Supple, trachea midline, no masses.  Respiratory Effort: No intercostal retractions or use of accessory muscles.   Lung Auscultation:      Clear to auscultation bilaterally; no rales, rhonchi or wheezing.  CV:            Regular rate and rhythm. No murmurs, rubs or gallops.  Abd:           Not examined.   Lymphadenopathy: Not examined.  Gait and Station: Normal.  Digits and Nails: No clubbing, cyanosis, petechiae, or nodes.   Cranial Nerves: II-XII grossly intact.  Skin:        No rashes, lesions or ulcers noted.               Ext:           No cyanosis or edema.      Assessment:  1. JOE (obstructive sleep apnea)  Referral to ENT      2. BMI 25.0-25.9,adult        3. Former smoker            Immunizations:    Flu:recommend fall 2024  Pneumovax 23:not due  Prevnar 13:not due  PCV 20: not due  COVID-19: 2021    Plan:  Patient has severe obstructive sleep apnea and has been intolerant to CPAP and oral appliance therapy.  He is a healthy weight.  He does qualify for inspire implant would like to pursue this with ENT.  Will place referral today.  Reviewed " need for consistent follow-up with implant therapy and what is required.   Referral ENT Dr. Matias/Nellie for Inspire Implant  Encouraged healthy diet and activity for regular conditioning.  Follow-up in 4 months with sleep MD only for implant activation, sooner if needed.    Please note that this dictation was created using voice recognition software. I have made every reasonable attempt to correct obvious errors, but it is possible there are errors of grammar and possibly content that I did not discover before finalizing the note.

## 2024-08-29 ENCOUNTER — HOSPITAL ENCOUNTER (OUTPATIENT)
Dept: LAB | Facility: MEDICAL CENTER | Age: 44
End: 2024-08-29

## 2024-08-29 ENCOUNTER — HOSPITAL ENCOUNTER (OUTPATIENT)
Dept: LAB | Facility: MEDICAL CENTER | Age: 44
End: 2024-08-29
Payer: COMMERCIAL

## 2024-08-29 DIAGNOSIS — Z00.6 RESEARCH STUDY PATIENT: ICD-10-CM

## 2024-08-29 DIAGNOSIS — E78.1 HYPERTRIGLYCERIDEMIA WITHOUT HYPERCHOLESTEROLEMIA: ICD-10-CM

## 2024-08-29 LAB
CHOLEST SERPL-MCNC: 185 MG/DL (ref 100–199)
FASTING STATUS PATIENT QL REPORTED: NORMAL
HDLC SERPL-MCNC: 38 MG/DL
LDLC SERPL CALC-MCNC: 75 MG/DL
TRIGL SERPL-MCNC: 362 MG/DL (ref 0–149)

## 2024-08-29 PROCEDURE — 36415 COLL VENOUS BLD VENIPUNCTURE: CPT

## 2024-08-29 PROCEDURE — 80061 LIPID PANEL: CPT

## 2024-09-05 LAB
ELF SCORE: 8.48 - (ref 9.8–11.3)
RELATIVE RISK: NORMAL
RISK GROUP: NORMAL
RISK: 3.3 %

## 2024-09-11 LAB
APOB+LDLR+PCSK9 GENE MUT ANL BLD/T: NOT DETECTED
BRCA1+BRCA2 DEL+DUP + FULL MUT ANL BLD/T: NOT DETECTED
MLH1+MSH2+MSH6+PMS2 GN DEL+DUP+FUL M: NOT DETECTED

## 2025-01-10 ENCOUNTER — HOSPITAL ENCOUNTER (EMERGENCY)
Facility: MEDICAL CENTER | Age: 45
End: 2025-01-11
Attending: EMERGENCY MEDICINE
Payer: COMMERCIAL

## 2025-01-10 ENCOUNTER — APPOINTMENT (OUTPATIENT)
Dept: RADIOLOGY | Facility: MEDICAL CENTER | Age: 45
End: 2025-01-10
Attending: EMERGENCY MEDICINE
Payer: COMMERCIAL

## 2025-01-10 DIAGNOSIS — R41.82 ALTERED MENTAL STATUS, UNSPECIFIED ALTERED MENTAL STATUS TYPE: Primary | ICD-10-CM
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
## 2025-01-10 DIAGNOSIS — Z00.8 MEDICAL CLEARANCE FOR INCARCERATION: ICD-10-CM

## 2025-01-10 PROCEDURE — 99284 EMERGENCY DEPT VISIT MOD MDM: CPT

## 2025-01-10 ASSESSMENT — FIBROSIS 4 INDEX: FIB4 SCORE: 0.56

## 2025-01-10 ASSESSMENT — PAIN DESCRIPTION - PAIN TYPE: TYPE: ACUTE PAIN

## 2025-01-11 VITALS
DIASTOLIC BLOOD PRESSURE: 88 MMHG | HEIGHT: 68 IN | TEMPERATURE: 97.2 F | OXYGEN SATURATION: 95 % | BODY MASS INDEX: 28.04 KG/M2 | RESPIRATION RATE: 17 BRPM | HEART RATE: 101 BPM | SYSTOLIC BLOOD PRESSURE: 135 MMHG | WEIGHT: 185 LBS

## 2025-01-11 PROCEDURE — 70450 CT HEAD/BRAIN W/O DYE: CPT

## 2025-01-11 NOTE — ED NOTES
Rounded on patient, resting with calm unlabored breathing. Cooperative with staff. In direct supervision of designee.
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
## 2025-01-16 ENCOUNTER — APPOINTMENT (OUTPATIENT)
Dept: SLEEP MEDICINE | Facility: MEDICAL CENTER | Age: 45
End: 2025-01-16
Attending: PREVENTIVE MEDICINE
Payer: COMMERCIAL

## 2025-04-21 ENCOUNTER — APPOINTMENT (OUTPATIENT)
Dept: ADMISSIONS | Facility: MEDICAL CENTER | Age: 45
End: 2025-04-21
Attending: OTOLARYNGOLOGY
Payer: COMMERCIAL

## 2025-04-28 ENCOUNTER — PRE-ADMISSION TESTING (OUTPATIENT)
Dept: ADMISSIONS | Facility: MEDICAL CENTER | Age: 45
End: 2025-04-28
Attending: OTOLARYNGOLOGY
Payer: COMMERCIAL

## 2025-05-19 ENCOUNTER — ANESTHESIA (OUTPATIENT)
Dept: SURGERY | Facility: MEDICAL CENTER | Age: 45
End: 2025-05-19
Payer: COMMERCIAL

## 2025-05-19 ENCOUNTER — ANESTHESIA EVENT (OUTPATIENT)
Dept: SURGERY | Facility: MEDICAL CENTER | Age: 45
End: 2025-05-19
Payer: COMMERCIAL

## 2025-05-19 ENCOUNTER — HOSPITAL ENCOUNTER (OUTPATIENT)
Facility: MEDICAL CENTER | Age: 45
End: 2025-05-19
Attending: OTOLARYNGOLOGY | Admitting: OTOLARYNGOLOGY
Payer: COMMERCIAL

## 2025-05-19 VITALS
SYSTOLIC BLOOD PRESSURE: 145 MMHG | OXYGEN SATURATION: 94 % | WEIGHT: 179.68 LBS | RESPIRATION RATE: 20 BRPM | BODY MASS INDEX: 27.23 KG/M2 | DIASTOLIC BLOOD PRESSURE: 100 MMHG | HEIGHT: 68 IN | TEMPERATURE: 97 F | HEART RATE: 81 BPM

## 2025-05-19 DIAGNOSIS — G89.18 POST-OPERATIVE PAIN: Primary | ICD-10-CM

## 2025-05-19 PROCEDURE — 160035 HCHG PACU - 1ST 60 MINS PHASE I: Performed by: OTOLARYNGOLOGY

## 2025-05-19 PROCEDURE — 160046 HCHG PACU - 1ST 60 MINS PHASE II: Performed by: OTOLARYNGOLOGY

## 2025-05-19 PROCEDURE — 700101 HCHG RX REV CODE 250: Performed by: ANESTHESIOLOGY

## 2025-05-19 PROCEDURE — 160047 HCHG PACU  - EA ADDL 30 MINS PHASE II: Performed by: OTOLARYNGOLOGY

## 2025-05-19 PROCEDURE — 160002 HCHG RECOVERY MINUTES (STAT): Performed by: OTOLARYNGOLOGY

## 2025-05-19 PROCEDURE — 160025 RECOVERY II MINUTES (STATS): Performed by: OTOLARYNGOLOGY

## 2025-05-19 PROCEDURE — 160029 HCHG SURGERY MINUTES - 1ST 30 MINS LEVEL 4: Performed by: OTOLARYNGOLOGY

## 2025-05-19 PROCEDURE — 160015 HCHG STAT PREOP MINUTES: Performed by: OTOLARYNGOLOGY

## 2025-05-19 PROCEDURE — 160048 HCHG OR STATISTICAL LEVEL 1-5: Performed by: OTOLARYNGOLOGY

## 2025-05-19 PROCEDURE — 700105 HCHG RX REV CODE 258: Performed by: OTOLARYNGOLOGY

## 2025-05-19 PROCEDURE — 160009 HCHG ANES TIME/MIN: Performed by: OTOLARYNGOLOGY

## 2025-05-19 PROCEDURE — 160041 HCHG SURGERY MINUTES - EA ADDL 1 MIN LEVEL 4: Performed by: OTOLARYNGOLOGY

## 2025-05-19 PROCEDURE — 700101 HCHG RX REV CODE 250: Performed by: OTOLARYNGOLOGY

## 2025-05-19 PROCEDURE — 700111 HCHG RX REV CODE 636 W/ 250 OVERRIDE (IP): Mod: JZ | Performed by: ANESTHESIOLOGY

## 2025-05-19 PROCEDURE — 700102 HCHG RX REV CODE 250 W/ 637 OVERRIDE(OP): Performed by: OTOLARYNGOLOGY

## 2025-05-19 PROCEDURE — A9270 NON-COVERED ITEM OR SERVICE: HCPCS | Performed by: OTOLARYNGOLOGY

## 2025-05-19 RX ORDER — MIDAZOLAM HYDROCHLORIDE 1 MG/ML
INJECTION INTRAMUSCULAR; INTRAVENOUS PRN
Status: DISCONTINUED | OUTPATIENT
Start: 2025-05-19 | End: 2025-05-19 | Stop reason: SURG

## 2025-05-19 RX ORDER — KETOROLAC TROMETHAMINE 15 MG/ML
INJECTION, SOLUTION INTRAMUSCULAR; INTRAVENOUS PRN
Status: DISCONTINUED | OUTPATIENT
Start: 2025-05-19 | End: 2025-05-19 | Stop reason: SURG

## 2025-05-19 RX ORDER — ONDANSETRON 2 MG/ML
4 INJECTION INTRAMUSCULAR; INTRAVENOUS
Status: DISCONTINUED | OUTPATIENT
Start: 2025-05-19 | End: 2025-05-19 | Stop reason: HOSPADM

## 2025-05-19 RX ORDER — ONDANSETRON 2 MG/ML
INJECTION INTRAMUSCULAR; INTRAVENOUS PRN
Status: DISCONTINUED | OUTPATIENT
Start: 2025-05-19 | End: 2025-05-19 | Stop reason: SURG

## 2025-05-19 RX ORDER — SODIUM CHLORIDE, SODIUM LACTATE, POTASSIUM CHLORIDE, CALCIUM CHLORIDE 600; 310; 30; 20 MG/100ML; MG/100ML; MG/100ML; MG/100ML
INJECTION, SOLUTION INTRAVENOUS CONTINUOUS
Status: ACTIVE | OUTPATIENT
Start: 2025-05-19 | End: 2025-05-19

## 2025-05-19 RX ORDER — HYDROMORPHONE HYDROCHLORIDE 1 MG/ML
0.2 INJECTION, SOLUTION INTRAMUSCULAR; INTRAVENOUS; SUBCUTANEOUS
Status: DISCONTINUED | OUTPATIENT
Start: 2025-05-19 | End: 2025-05-19 | Stop reason: HOSPADM

## 2025-05-19 RX ORDER — BACITRACIN ZINC 500 [USP'U]/G
OINTMENT TOPICAL
Status: DISCONTINUED
Start: 2025-05-19 | End: 2025-05-19 | Stop reason: HOSPADM

## 2025-05-19 RX ORDER — HYDROCODONE BITARTRATE AND ACETAMINOPHEN 7.5; 325 MG/1; MG/1
.5-1 TABLET ORAL EVERY 4 HOURS PRN
Qty: 10 TABLET | Refills: 0 | Status: SHIPPED | OUTPATIENT
Start: 2025-05-19 | End: 2025-05-24

## 2025-05-19 RX ORDER — DEXAMETHASONE SODIUM PHOSPHATE 4 MG/ML
INJECTION, SOLUTION INTRA-ARTICULAR; INTRALESIONAL; INTRAMUSCULAR; INTRAVENOUS; SOFT TISSUE PRN
Status: DISCONTINUED | OUTPATIENT
Start: 2025-05-19 | End: 2025-05-19 | Stop reason: SURG

## 2025-05-19 RX ORDER — OXYMETAZOLINE HYDROCHLORIDE 0.05 G/100ML
SPRAY NASAL
Status: DISCONTINUED
Start: 2025-05-19 | End: 2025-05-19 | Stop reason: HOSPADM

## 2025-05-19 RX ORDER — BACITRACIN ZINC 500 [USP'U]/G
OINTMENT TOPICAL
Status: DISCONTINUED | OUTPATIENT
Start: 2025-05-19 | End: 2025-05-19 | Stop reason: HOSPADM

## 2025-05-19 RX ORDER — EPHEDRINE SULFATE 50 MG/ML
5 INJECTION, SOLUTION INTRAVENOUS
Status: DISCONTINUED | OUTPATIENT
Start: 2025-05-19 | End: 2025-05-19 | Stop reason: HOSPADM

## 2025-05-19 RX ORDER — HYDROMORPHONE HYDROCHLORIDE 1 MG/ML
0.1 INJECTION, SOLUTION INTRAMUSCULAR; INTRAVENOUS; SUBCUTANEOUS
Status: DISCONTINUED | OUTPATIENT
Start: 2025-05-19 | End: 2025-05-19 | Stop reason: HOSPADM

## 2025-05-19 RX ORDER — DIPHENHYDRAMINE HYDROCHLORIDE 50 MG/ML
12.5 INJECTION, SOLUTION INTRAMUSCULAR; INTRAVENOUS
Status: DISCONTINUED | OUTPATIENT
Start: 2025-05-19 | End: 2025-05-19 | Stop reason: HOSPADM

## 2025-05-19 RX ORDER — LIDOCAINE HYDROCHLORIDE AND EPINEPHRINE 10; 10 MG/ML; UG/ML
INJECTION, SOLUTION INFILTRATION; PERINEURAL
Status: DISCONTINUED | OUTPATIENT
Start: 2025-05-19 | End: 2025-05-19 | Stop reason: HOSPADM

## 2025-05-19 RX ORDER — HYDRALAZINE HYDROCHLORIDE 20 MG/ML
5 INJECTION INTRAMUSCULAR; INTRAVENOUS
Status: DISCONTINUED | OUTPATIENT
Start: 2025-05-19 | End: 2025-05-19 | Stop reason: HOSPADM

## 2025-05-19 RX ORDER — OXYMETAZOLINE HYDROCHLORIDE 0.05 G/100ML
2 SPRAY NASAL
Status: COMPLETED | OUTPATIENT
Start: 2025-05-19 | End: 2025-05-19

## 2025-05-19 RX ORDER — ONDANSETRON 2 MG/ML
4 INJECTION INTRAMUSCULAR; INTRAVENOUS EVERY 4 HOURS PRN
Status: DISCONTINUED | OUTPATIENT
Start: 2025-05-19 | End: 2025-05-19 | Stop reason: HOSPADM

## 2025-05-19 RX ORDER — LABETALOL HYDROCHLORIDE 5 MG/ML
5 INJECTION, SOLUTION INTRAVENOUS
Status: DISCONTINUED | OUTPATIENT
Start: 2025-05-19 | End: 2025-05-19 | Stop reason: HOSPADM

## 2025-05-19 RX ORDER — HYDROMORPHONE HYDROCHLORIDE 1 MG/ML
0.4 INJECTION, SOLUTION INTRAMUSCULAR; INTRAVENOUS; SUBCUTANEOUS
Status: DISCONTINUED | OUTPATIENT
Start: 2025-05-19 | End: 2025-05-19 | Stop reason: HOSPADM

## 2025-05-19 RX ORDER — HALOPERIDOL 5 MG/ML
1 INJECTION INTRAMUSCULAR
Status: DISCONTINUED | OUTPATIENT
Start: 2025-05-19 | End: 2025-05-19 | Stop reason: HOSPADM

## 2025-05-19 RX ORDER — OXYCODONE HCL 5 MG/5 ML
5 SOLUTION, ORAL ORAL
Refills: 0 | Status: DISCONTINUED | OUTPATIENT
Start: 2025-05-19 | End: 2025-05-19 | Stop reason: HOSPADM

## 2025-05-19 RX ORDER — EPHEDRINE SULFATE 50 MG/ML
INJECTION, SOLUTION INTRAVENOUS PRN
Status: DISCONTINUED | OUTPATIENT
Start: 2025-05-19 | End: 2025-05-19 | Stop reason: SURG

## 2025-05-19 RX ORDER — OXYMETAZOLINE HYDROCHLORIDE 0.05 G/100ML
SPRAY NASAL
Status: DISCONTINUED | OUTPATIENT
Start: 2025-05-19 | End: 2025-05-19 | Stop reason: HOSPADM

## 2025-05-19 RX ORDER — LIDOCAINE HYDROCHLORIDE 20 MG/ML
INJECTION, SOLUTION EPIDURAL; INFILTRATION; INTRACAUDAL; PERINEURAL PRN
Status: DISCONTINUED | OUTPATIENT
Start: 2025-05-19 | End: 2025-05-19 | Stop reason: SURG

## 2025-05-19 RX ORDER — METOPROLOL TARTRATE 1 MG/ML
1 INJECTION, SOLUTION INTRAVENOUS
Status: DISCONTINUED | OUTPATIENT
Start: 2025-05-19 | End: 2025-05-19 | Stop reason: HOSPADM

## 2025-05-19 RX ORDER — IPRATROPIUM BROMIDE AND ALBUTEROL SULFATE 2.5; .5 MG/3ML; MG/3ML
3 SOLUTION RESPIRATORY (INHALATION)
Status: DISCONTINUED | OUTPATIENT
Start: 2025-05-19 | End: 2025-05-19 | Stop reason: HOSPADM

## 2025-05-19 RX ORDER — ROCURONIUM BROMIDE 10 MG/ML
INJECTION, SOLUTION INTRAVENOUS PRN
Status: DISCONTINUED | OUTPATIENT
Start: 2025-05-19 | End: 2025-05-19 | Stop reason: SURG

## 2025-05-19 RX ORDER — OXYCODONE HCL 5 MG/5 ML
10 SOLUTION, ORAL ORAL
Refills: 0 | Status: DISCONTINUED | OUTPATIENT
Start: 2025-05-19 | End: 2025-05-19 | Stop reason: HOSPADM

## 2025-05-19 RX ORDER — MEPERIDINE HYDROCHLORIDE 25 MG/ML
12.5 INJECTION INTRAMUSCULAR; INTRAVENOUS; SUBCUTANEOUS
Status: DISCONTINUED | OUTPATIENT
Start: 2025-05-19 | End: 2025-05-19 | Stop reason: HOSPADM

## 2025-05-19 RX ADMIN — SUGAMMADEX 200 MG: 100 INJECTION, SOLUTION INTRAVENOUS at 09:33

## 2025-05-19 RX ADMIN — ONDANSETRON 4 MG: 2 INJECTION INTRAMUSCULAR; INTRAVENOUS at 10:10

## 2025-05-19 RX ADMIN — ROCURONIUM BROMIDE 50 MG: 10 INJECTION INTRAVENOUS at 08:57

## 2025-05-19 RX ADMIN — PROPOFOL 100 MG: 10 INJECTION, EMULSION INTRAVENOUS at 09:13

## 2025-05-19 RX ADMIN — FENTANYL CITRATE 100 MCG: 50 INJECTION, SOLUTION INTRAMUSCULAR; INTRAVENOUS at 09:00

## 2025-05-19 RX ADMIN — PROPOFOL 150 MCG/KG/MIN: 10 INJECTION, EMULSION INTRAVENOUS at 08:48

## 2025-05-19 RX ADMIN — ONDANSETRON 4 MG: 2 INJECTION INTRAMUSCULAR; INTRAVENOUS at 09:15

## 2025-05-19 RX ADMIN — SODIUM CHLORIDE, POTASSIUM CHLORIDE, SODIUM LACTATE AND CALCIUM CHLORIDE: 600; 310; 30; 20 INJECTION, SOLUTION INTRAVENOUS at 07:24

## 2025-05-19 RX ADMIN — EPHEDRINE SULFATE 10 MG: 50 INJECTION, SOLUTION INTRAVENOUS at 09:19

## 2025-05-19 RX ADMIN — PROPOFOL 100 MG: 10 INJECTION, EMULSION INTRAVENOUS at 08:57

## 2025-05-19 RX ADMIN — MIDAZOLAM HYDROCHLORIDE 2 MG: 1 INJECTION, SOLUTION INTRAMUSCULAR; INTRAVENOUS at 08:49

## 2025-05-19 RX ADMIN — DEXAMETHASONE SODIUM PHOSPHATE 8 MG: 4 INJECTION INTRA-ARTICULAR; INTRALESIONAL; INTRAMUSCULAR; INTRAVENOUS; SOFT TISSUE at 09:15

## 2025-05-19 RX ADMIN — Medication 2 SPRAY: at 07:23

## 2025-05-19 RX ADMIN — LIDOCAINE HYDROCHLORIDE 50 MG: 20 INJECTION, SOLUTION EPIDURAL; INFILTRATION; INTRACAUDAL; PERINEURAL at 08:57

## 2025-05-19 RX ADMIN — KETOROLAC TROMETHAMINE 15 MG: 15 INJECTION, SOLUTION INTRAMUSCULAR; INTRAVENOUS at 09:19

## 2025-05-19 ASSESSMENT — PAIN DESCRIPTION - PAIN TYPE
TYPE: SURGICAL PAIN

## 2025-05-19 ASSESSMENT — FIBROSIS 4 INDEX
FIB4 SCORE: 0.56
FIB4 SCORE: 0.56

## 2025-05-19 ASSESSMENT — PAIN SCALES - GENERAL: PAIN_LEVEL: 2

## 2025-05-19 NOTE — OP REPORT
DD: 5/19/2025  9:35 AM       PREOPERATIVE DIAGNOSIS:  1.Obstructive Sleep Apnea  2.Deviated Septum  3.Bilateral inferior turbinate hypertrophy    POSTOPERATIVE DIAGNOSIS:   Same     PROCEDURE PERFORMED:  1.Septoplasty  2.Bilateral inferior turbinate reduction  3.Drug induced sleep endoscopy    SURGEON: Dawson Matias M.D.    ANESTHESIA: General endotracheal anesthesia.    INDICATIONS: The patient is a 44 y.o.-year-old male with obstructive sleep apnea and nasal airway obstruction refractory to topical steroid spray. He  is taken to the operating room for the above procedure.    FINDINGS:  1. The septum was deviated to the with a gentle deviation to the left but near-total nasal airway obstruction.  2. There WAS evidence of a complete concentric palatal   obstruction and he does NOT appear to be a candidate anatomically for hypoglossal   nerve stimulation therapy.    SPECIMEN: None    BRIEF CLINICAL HISTORY:  Patient presents with a history of   moderate to severe symptomatic obstructive sleep apnea who is intolerant and   unable to achieve benefit with positive pressure therapy.  The presents today   for drug-induced sleep endoscopy to better characterize the patients localizations and   pattern of obstruction to predict appropriate medical and/or surgical options   moving forward. The pt has also failed topical steroid therapy for nasal airway obstruction.    ESTIMATED BLOOD LOSS: 10 mL.    PROCEDURE: Following informed consent, The patient was brought to the operating room and   was anesthetized via the standard drug-induced sleep endoscopy protocol.  The   propofol infusion rate was started at 75 mcg and increased to 175 at which   point conditions that mimic sleep were gradually observed.  Under these   conditions, a flexible scope was inserted to examine both sides of the nasal   cavity as well as the pharynx and larynx.  The VOTE score at baseline was   complete anterior posterior collapse with simulated jaw  advancement and tongue   advancement.  The hypopharyngeal obstruction and secondarily the palatal   collapse also improved.  Unfortunately, there is evidence of a complete   concentric palatal obstruction and he does appear to be a candidate   anatomically for hypoglossal nerve stimulation therapy.     Next the septoplasty was addressed. Pt was draped and prepared in the normal surgical fashion for this procedure.  Pt was injected with 8 of 1% lidocaine with 1/511357 epinephrine.  Afrin soaked pledgets were placed in the nasal cavity bilaterally.    The afrin soaked pledgets were removed.  An anterior septal incision was made on the left.  Then bilateral mucoperichondrial flaps were elevated to isolate the tee and cartilaginous septum.  A 1.5 cm anterior and dorsal strut were created with a freer.  Any deviated portions of the tee and cartilaginous septum were then carefully removed.  Any straight portions were replaced between the mucoperichondrial flaps.     Next the inferior turbinates were addressed.  First the right turbinate was addressed. It was injected with an additional 1cc of lido/epi.  A small submucosal pocket was created with a  freer.  Next the microdebrider was used to perform a submucosal reduction.  After which the turbinate was lateralized with a bois fracture elevator.  Next, the left turbinate was reduced in a similar fashion.    Next, the anterior septal incision was closed and a quilting stitch with chromic gut was used to reaproximate the flaps.  Bilateral barbosa splints were placed and sutured in place with a single 2.0 silk suture.  At this point my portion of the procedure was terminated and the patient was turned back over to anesthesia for emergence.    The patient tolerated the procedure well and there were no apparent complication. All sponge, needle, and instrument counts were correct on 2 separate occasions. She  was awakened, extubated, and transferred to the recovery room in  satisfactory condition.           ____________________________________   Dawson Matias M.D.

## 2025-05-19 NOTE — ANESTHESIA PROCEDURE NOTES
Airway    Date/Time: 5/19/2025 8:58 AM    Performed by: Joe Tobar M.D.  Authorized by: Joe Tobar M.D.    Location:  OR  Urgency:  Elective  Difficult Airway: No    Indications for Airway Management:  Anesthesia      Spontaneous Ventilation: absent    Sedation Level:  Deep  Preoxygenated: Yes    Patient Position:  Sniffing  Mask Difficulty Assessment:  1 - vent by mask  Final Airway Type:  Endotracheal airway  Final Endotracheal Airway:  ETT  Cuffed: Yes    Technique Used for Successful ETT Placement:  Direct laryngoscopy    Insertion Site:  Oral  Blade Type:  Marquis  Laryngoscope Blade/Videolaryngoscope Blade Size:  2  ETT Size (mm):  7.5  Measured from:  Teeth  ETT to Teeth (cm):  23  Placement Verified by: auscultation and capnometry    Cormack-Lehane Classification:  Grade I - full view of glottis  Number of Attempts at Approach:  1

## 2025-05-19 NOTE — DISCHARGE INSTRUCTIONS
If any questions arise, call your provider.  If your provider is not available, please feel free to call the Surgical Center at [phone].    MEDICATIONS: Resume taking daily medication.  Take prescribed pain medication with food.  If no medication is prescribed, you may take non-aspirin pain medication if needed.  PAIN MEDICATION CAN BE VERY CONSTIPATING.  Take a stool softener or laxative such as senokot, pericolace, or milk of magnesia if needed.    What to Expect Post Anesthesia    Rest and take it easy for the first 24 hours.  A responsible adult is recommended to remain with you during that time.  It is normal to feel sleepy.  We encourage you to not do anything that requires balance, judgment or coordination.    FOR 24 HOURS DO NOT:  Drive, operate machinery or run household appliances.  Drink beer or alcoholic beverages.  Make important decisions or sign legal documents.    To avoid nausea, slowly advance diet as tolerated, avoiding spicy or greasy foods for the first day.  Add more substantial food to your diet according to your provider's instructions.  Babies can be fed formula or breast milk as soon as they are hungry.  INCREASE FLUIDS AND FIBER TO AVOID CONSTIPATION.    MILD FLU-LIKE SYMPTOMS ARE NORMAL.  YOU MAY EXPERIENCE GENERALIZED MUSCLE ACHES, THROAT IRRITATION, HEADACHE AND/OR SOME NAUSEA.

## 2025-05-19 NOTE — OR NURSING
0938 Pt to PACU from OR. Report from anesthesiologist and OR RN. OPA in place. Respirations even and unlabored. VSS.     1009 Patient is tolerating sips of water.    1027 Patient's friend updated via phone call.    1130 Patient's friend brought to bedside. Discharge instructions discussed with patient and their friend. All questions answered. Verbalized understanding.    1136 Patient up and dressed.    1140 PIV removed with tip intact.    1141 Pt escorted out of department in wheelchair with all belongings. Discharged home to responsible adult.

## 2025-05-19 NOTE — ANESTHESIA POSTPROCEDURE EVALUATION
Patient: Ashley Corrales    Procedure Summary       Date: 05/19/25 Room / Location: [facility] ROOM 28 / SURGERY SAME DAY [facility]    Anesthesia Start: 0847 Anesthesia Stop: 0944    Procedures:       EVALUATION OF SLEEP DISORDERED BREATHING BY EXAMINATION OF UPPER AIRWAY USING AN ENDOSCOPE, SEPTOPLASTY, BILATERAL TURBINOPLASTY (Bilateral: Throat)      SEPTOPLASTY, NOSE, WITH TURBINOPLASTY (Bilateral: Nose) Diagnosis: (OBSTRUCTIVE SLEEP APNEA, ; DEVIATED NASAL SEPTUM)    Surgeons: Dawson Matias M.D. Responsible Provider: Joe Tobar M.D.    Anesthesia Type: general ASA Status: 2            Final Anesthesia Type: general  Last vitals  BP   Blood Pressure: (!) 132/106    Temp   36.2 °C (97.1 °F)    Pulse   80   Resp   16    SpO2   97 %      Anesthesia Post Evaluation    Patient location during evaluation: PACU  Patient participation: complete - patient participated  Level of consciousness: awake and alert  Pain score: 2    Airway patency: patent  Anesthetic complications: no  Cardiovascular status: hemodynamically stable  Respiratory status: acceptable  Hydration status: euvolemic    PONV: none          There were no known notable events for this encounter.     Nurse Pain Score: 0 (NPRS)

## 2025-05-19 NOTE — ANESTHESIA PREPROCEDURE EVALUATION
Case: [id number] Date/Time: 05/19/25 0845    Procedures:       EVALUATION OF SLEEP DISORDERED BREATHING BY EXAMINATION OF UPPER AIRWAY USING AN ENDOSCOPE, SEPTOPLASTY, BILATERAL TURBINOPLASTY      SEPTOPLASTY, NOSE, WITH TURBINOPLASTY    Pre-op diagnosis: OBSTRUCTIVE SLEEP APNEA    Location: [facility] ROOM 28 / SURGERY SAME DAY [facility]    Surgeons: Dawson Matias M.D.            Relevant Problems   ANESTHESIA   (positive) JOE (obstructive sleep apnea)       Physical Exam    Airway   Mallampati: II  TM distance: >3 FB  Neck ROM: full       Cardiovascular - normal exam  Rhythm: regular  Rate: normal    (-) murmur     Dental - normal exam           Pulmonary - normal examBreath sounds clear to auscultation     Abdominal    Neurological - normal exam                   Anesthesia Plan    ASA 2       Plan - general       Airway plan will be ETT    (Sedation then GA.)      Induction: intravenous    Postoperative Plan: Postoperative administration of opioids is intended.    Pertinent diagnostic labs and testing reviewed    Informed Consent:    Anesthetic plan and risks discussed with patient.    Use of blood products discussed with: patient whom consented to blood products.

## 2025-05-19 NOTE — ANESTHESIA TIME REPORT
Anesthesia Start and Stop Event Times       Date Time Event    5/19/2025 0845 Ready for Procedure     0847 Anesthesia Start     0944 Anesthesia Stop          Responsible Staff  05/19/25      Name Role Begin End    Joe Tobar M.D. Anesth 0847 0944          Overtime Reason:  no overtime (within assigned shift)    Comments:

## (undated) DEVICE — SUCTION INSTRUMENT YANKAUER BULBOUS TIP W/O VENT (50EA/CA)

## (undated) DEVICE — GOWN WARMING STANDARD FLEX - (30/CA)

## (undated) DEVICE — SUTURE GENERAL

## (undated) DEVICE — SODIUM CHL IRRIGATION 0.9% 1000ML (12EA/CA)

## (undated) DEVICE — GLOVE BIOGEL SZ 7.5 SURGICAL PF LTX - (50PR/BX 4BX/CA)

## (undated) DEVICE — TUBE CONNECTING SUCTION - CLEAR PLASTIC STERILE 72 IN (50EA/CA)

## (undated) DEVICE — CANNULA O2 COMFORT SOFT EAR ADULT 7 FT TUBING (50/CA)

## (undated) DEVICE — SUTURE 2-0 SILK FS (12EA/BX)

## (undated) DEVICE — ANTI-FOG SOLUTION - 60BTL/CA

## (undated) DEVICE — BRONCHOSCOPE 4 SLIM 3.8/1.2 (5EA/CA)

## (undated) DEVICE — CANISTER SUCTION RIGID RED 1500CC (40EA/CA)

## (undated) DEVICE — KIT  I.V. START (100EA/CA)

## (undated) DEVICE — ELECTRODE DUAL RETURN W/ CORD - (50/PK)

## (undated) DEVICE — SOD. CHL. INJ. 0.9% 250 ML - (36/CA 50CA/PF)

## (undated) DEVICE — CANISTER SUCTION 3000ML MECHANICAL FILTER AUTO SHUTOFF MEDI-VAC NONSTERILE LF DISP (40EA/CA)

## (undated) DEVICE — TUBING CLEARLINK DUO-VENT - C-FLO (48EA/CA)

## (undated) DEVICE — SUTURE 4-0 CHROMIC GUT - 18 INCH G-3 D/A (12/BX)

## (undated) DEVICE — BLADE INFERIOR TURBINATE 2.9MM (5EA/PK)

## (undated) DEVICE — TOWEL STOP TIMEOUT SAFETY FLAG (40EA/CA)

## (undated) DEVICE — SENSOR OXIMETER ADULT SPO2 RD SET (20EA/BX)

## (undated) DEVICE — SUTURE  MONOCRYL PLUS UD 27IN(70CM) USP4-0(M1.5) S/A PS-2 PRM MP (36EA/BX)

## (undated) DEVICE — LACTATED RINGERS INJ 1000 ML - (14EA/CA 60CA/PF)

## (undated) DEVICE — MASK OXYGEN VNYL ADLT MED CONC WITH 7 FOOT TUBING - (50EA/CA)

## (undated) DEVICE — WATER IRRIGATION STERILE 1000ML (12EA/CA)

## (undated) DEVICE — PATTIES SURG X-RAYCOTTONOID - 1/2 X 3 IN (200/CA)

## (undated) DEVICE — SLEEVE VASO DVT COMPRESSION CALF MED - (10PR/CA)

## (undated) DEVICE — BOVIE FOOT CONTROL SUCTION - 6IN 10FR (25EA/CA)

## (undated) DEVICE — SET LEADWIRE 5 LEAD BEDSIDE DISPOSABLE ECG (1SET OF 5/EA)

## (undated) DEVICE — PACK ENT OR - (6EA/CA)

## (undated) DEVICE — SPLINT NASAL DOYLE AIRWAY (2PC/EA)